# Patient Record
Sex: MALE | Race: WHITE | NOT HISPANIC OR LATINO | Employment: OTHER | URBAN - METROPOLITAN AREA
[De-identification: names, ages, dates, MRNs, and addresses within clinical notes are randomized per-mention and may not be internally consistent; named-entity substitution may affect disease eponyms.]

---

## 2020-08-10 ENCOUNTER — TELEPHONE (OUTPATIENT)
Dept: PULMONOLOGY | Facility: HOSPICE | Age: 65
End: 2020-08-10

## 2020-08-10 ENCOUNTER — OFFICE VISIT (OUTPATIENT)
Dept: PULMONOLOGY | Facility: HOSPICE | Age: 65
End: 2020-08-10
Payer: MEDICARE

## 2020-08-10 VITALS
HEIGHT: 70 IN | SYSTOLIC BLOOD PRESSURE: 128 MMHG | HEART RATE: 100 BPM | DIASTOLIC BLOOD PRESSURE: 80 MMHG | OXYGEN SATURATION: 91 % | TEMPERATURE: 98.4 F | RESPIRATION RATE: 16 BRPM | BODY MASS INDEX: 25.05 KG/M2 | WEIGHT: 175 LBS

## 2020-08-10 DIAGNOSIS — J84.9 ILD (INTERSTITIAL LUNG DISEASE) (HCC): ICD-10-CM

## 2020-08-10 DIAGNOSIS — R91.8 OTHER NONSPECIFIC ABNORMAL FINDING OF LUNG FIELD: ICD-10-CM

## 2020-08-10 DIAGNOSIS — M06.00 RHEUMATOID ARTHRITIS WITH NEGATIVE RHEUMATOID FACTOR, INVOLVING UNSPECIFIED SITE (HCC): ICD-10-CM

## 2020-08-10 DIAGNOSIS — G47.33 OSA (OBSTRUCTIVE SLEEP APNEA): ICD-10-CM

## 2020-08-10 DIAGNOSIS — M34.9 SCLERODERMA (HCC): ICD-10-CM

## 2020-08-10 PROCEDURE — 99204 OFFICE O/P NEW MOD 45 MIN: CPT | Performed by: INTERNAL MEDICINE

## 2020-08-10 RX ORDER — OMEPRAZOLE 20 MG/1
20 CAPSULE, DELAYED RELEASE ORAL DAILY
Status: ON HOLD | COMMUNITY
End: 2023-12-18

## 2020-08-10 RX ORDER — ALBUTEROL SULFATE 2.5 MG/3ML
2.5 SOLUTION RESPIRATORY (INHALATION) EVERY 4 HOURS PRN
COMMUNITY
End: 2021-10-13 | Stop reason: SDUPTHER

## 2020-08-10 RX ORDER — ALBUTEROL SULFATE 90 UG/1
2 AEROSOL, METERED RESPIRATORY (INHALATION) EVERY 6 HOURS PRN
COMMUNITY
End: 2024-01-08

## 2020-08-10 ASSESSMENT — ENCOUNTER SYMPTOMS
PALPITATIONS: 0
PND: 0
SORE THROAT: 0
TREMORS: 0
ABDOMINAL PAIN: 0
WHEEZING: 0
CONSTIPATION: 0
CHILLS: 0
PHOTOPHOBIA: 0
SPUTUM PRODUCTION: 0
DIZZINESS: 0
EYE DISCHARGE: 0
HEARTBURN: 0
NAUSEA: 0
WEIGHT LOSS: 0
ORTHOPNEA: 0
COUGH: 1
SPEECH CHANGE: 0
SINUS PAIN: 0
STRIDOR: 0
DIARRHEA: 0
VOMITING: 0
DOUBLE VISION: 0
WEAKNESS: 0
FALLS: 0
BACK PAIN: 0
EYE REDNESS: 0
HEADACHES: 0
CLAUDICATION: 0
FOCAL WEAKNESS: 0
EYE PAIN: 0
NECK PAIN: 0
DEPRESSION: 0
MYALGIAS: 0
SHORTNESS OF BREATH: 1
HEMOPTYSIS: 0
DIAPHORESIS: 0
BLURRED VISION: 0
FEVER: 0

## 2020-08-10 ASSESSMENT — PAIN SCALES - GENERAL: PAINLEVEL: NO PAIN

## 2020-08-10 NOTE — TELEPHONE ENCOUNTER
Ofev paperwork started for Patient's OFEV per request of Dr. Almaguer  Doctor at [facility] have already started the patient on medication. Patient is stating that we need to re-do it on our end.

## 2020-08-10 NOTE — TELEPHONE ENCOUNTER
Per covermymeds Esbriet Is the preferred Medication for .   I will still submit for OFEV - Per patient he has already been started

## 2020-08-10 NOTE — PROGRESS NOTES
Chief Complaint   Patient presents with   • New Patient     Pulmonary Fibosis       HPI: This patient is a 64 y.o. male presenting for evaluation of CTD-ILD.  The pt PMHx is significant for seronegative RA with associated ILD previously followed by [facility] team.  Patient's other past medical history includes hypertension, dyslipidemia and gastroesophageal reflux disease.  He is a former tobacco user with less than 20-pack-year history and quit in .  He worked as an  for both [employer] in [employer] with no known occupational exposures.  No family history of autoimmune disease.  His mother suffered with arthritis and some dementia thought both to be age-related and father was a tobacco user and  from lung cancer.  The patient has a diagnosis of seronegative rheumatoid arthritis for which she has been treated with leflunomide, sulfasalazine and Humira in the past.  Both Humira and leflunomide were stopped for infection, specifically pneumonia.  He does have underlying interstitial lung disease and a CT chest from our institution in 2016 shows fibrotic changes at the lung bases.  A report of CT chest done at [facility] in 2019 suggests progressive disease with bilateral, peripheral honeycombing.  I do not have any pulmonary function testing on the patient.  Never required supplemental oxygen.  He was started on Ofev as anti-fibrotic therapy for CTD associated ILD roughly 1 month ago.  So far he is tolerating this well and had recent hepatic function panel but I do not have the results from this.  He is presenting today mainly to establish care.  He is followed by Dr. Perkins in [place] for RA.  The patient is active and walks his dog daily.  He has a chronic cough which has been treated with proton pump inhibitor as well as gabapentin and apparently he was evaluated by ENT in the past and it was thought that he had gastroesophageal reflux disease with an element of LPR.  He has  been admitted twice in the past year for an presumed pneumonia and recently saw infectious disease in [place] and was started on Bactrim daily and azithromycin 3 times weekly but I do not have the records as to why this regimen was chosen and if he ever had any positive cultures.  The patient today denies chest pain, sputum production, shortness of breath although he does feel wearing a mask exacerbates his breathing.  No fevers, chills, night sweats, weight loss.    Past Medical History:   Diagnosis Date   • Hyperlipidemia    • Hypertension    • Shortness of breath    • Sleep apnea    • Wears glasses        Social History     Socioeconomic History   • Marital status:      Spouse name: Not on file   • Number of children: Not on file   • Years of education: Not on file   • Highest education level: Not on file   Occupational History   • Not on file   Social Needs   • Financial resource strain: Not on file   • Food insecurity     Worry: Not on file     Inability: Not on file   • Transportation needs     Medical: Not on file     Non-medical: Not on file   Tobacco Use   • Smoking status: Former Smoker   • Smokeless tobacco: Never Used   Substance and Sexual Activity   • Alcohol use: Yes   • Drug use: No   • Sexual activity: Not on file   Lifestyle   • Physical activity     Days per week: Not on file     Minutes per session: Not on file   • Stress: Not on file   Relationships   • Social connections     Talks on phone: Not on file     Gets together: Not on file     Attends Taoist service: Not on file     Active member of club or organization: Not on file     Attends meetings of clubs or organizations: Not on file     Relationship status: Not on file   • Intimate partner violence     Fear of current or ex partner: Not on file     Emotionally abused: Not on file     Physically abused: Not on file     Forced sexual activity: Not on file   Other Topics Concern   • Not on file   Social History Narrative   • Not on file        History reviewed. No pertinent family history.    Current Outpatient Medications on File Prior to Visit   Medication Sig Dispense Refill   • omeprazole (PRILOSEC) 20 MG delayed-release capsule Take 20 mg by mouth every day.     • albuterol (PROAIR HFA) 108 (90 Base) MCG/ACT Aero Soln inhalation aerosol Inhale 2 Puffs by mouth every 6 hours as needed for Shortness of Breath.     • albuterol (PROVENTIL) 2.5mg/3ml Nebu Soln solution for nebulization 2.5 mg by Nebulization route every four hours as needed for Shortness of Breath.     • senna-docusate (PERICOLACE OR SENOKOT S) 8.6-50 MG Tab Take 1 Tab by mouth every day. Prn for constipation 30 Tab 0   • methocarbamol (ROBAXIN) 750 MG Tab Take 1 Tab by mouth 3 times a day. For spams 90 Tab 0   • simvastatin (ZOCOR) 40 MG Tab Take 40 mg by mouth every day.     • leflunomide (ARAVA) 20 MG Tab Take 20 mg by mouth every day.     • lisinopril (PRINIVIL) 10 MG Tab Take 10 mg by mouth every day.     • gemfibrozil (LOPID) 600 MG Tab Take 600 mg by mouth 2 times a day.     • gabapentin (NEURONTIN) 100 MG Cap Take 100 mg by mouth 2 Times a Day.     • Probiotic Product (PROBIOTIC DAILY PO) Take 1 Cap by mouth every day.     • niacin 500 MG Tab Take 500 mg by mouth every evening.     • metronidazole (FLAGYL) 500 MG Tab Take 500 mg by mouth 3 times a day. Started 10 day course on 10/27.     • ciprofloxacin (CIPRO) 500 MG Tab Take 500 mg by mouth every 12 hours. Started 10 day course on 10/27.     • ranitidine (ZANTAC) 150 MG Tab Take 150 mg by mouth 2 times a day.       No current facility-administered medications on file prior to visit.        Allergies: Codeine and Tape    ROS:   Review of Systems   Constitutional: Negative for chills, diaphoresis, fever, malaise/fatigue and weight loss.   HENT: Negative for congestion, ear discharge, ear pain, hearing loss, nosebleeds, sinus pain, sore throat and tinnitus.    Eyes: Negative for blurred vision, double vision, photophobia,  "pain, discharge and redness.   Respiratory: Positive for cough and shortness of breath. Negative for hemoptysis, sputum production, wheezing and stridor.    Cardiovascular: Negative for chest pain, palpitations, orthopnea, claudication, leg swelling and PND.   Gastrointestinal: Negative for abdominal pain, constipation, diarrhea, heartburn, nausea and vomiting.   Genitourinary: Negative for dysuria and urgency.   Musculoskeletal: Negative for back pain, falls, joint pain, myalgias and neck pain.   Skin: Negative for itching and rash.   Neurological: Negative for dizziness, tremors, speech change, focal weakness, weakness and headaches.   Endo/Heme/Allergies: Negative for environmental allergies.   Psychiatric/Behavioral: Negative for depression.       /80   Pulse 100   Temp 36.9 °C (98.4 °F) (Temporal)   Resp 16   Ht 1.778 m (5' 10\")   Wt 79.4 kg (175 lb)   SpO2 91%     Physical Exam:  Physical Exam  Vitals signs reviewed.   Constitutional:       General: He is not in acute distress.     Appearance: Normal appearance. He is normal weight.   HENT:      Head: Normocephalic and atraumatic.      Right Ear: External ear normal.      Left Ear: External ear normal.      Nose: Nose normal. No congestion.      Mouth/Throat:      Mouth: Mucous membranes are moist.      Pharynx: Oropharynx is clear. No oropharyngeal exudate.   Eyes:      General: No scleral icterus.     Extraocular Movements: Extraocular movements intact.      Conjunctiva/sclera: Conjunctivae normal.      Pupils: Pupils are equal, round, and reactive to light.   Neck:      Musculoskeletal: Normal range of motion and neck supple.   Cardiovascular:      Rate and Rhythm: Normal rate and regular rhythm.      Heart sounds: Normal heart sounds. No murmur. No gallop.    Pulmonary:      Effort: Pulmonary effort is normal. No respiratory distress.      Breath sounds: No wheezing or rales.      Comments: Fine, early, dry inspiratory crackles b/l lung " bases  Abdominal:      General: There is no distension.      Palpations: Abdomen is soft.   Musculoskeletal: Normal range of motion.      Right lower leg: No edema.      Left lower leg: No edema.   Skin:     General: Skin is warm and dry.      Findings: No rash.   Neurological:      Mental Status: He is alert and oriented to person, place, and time.      Cranial Nerves: No cranial nerve deficit.   Psychiatric:         Mood and Affect: Mood normal.         Behavior: Behavior normal.         PFTs as reviewed by me personally: none    Imaging as reviewed by me personally: as per hPI    Assessment:  1. ILD (interstitial lung disease) (HCC)  PULMONARY FUNCTION TESTS -Test requested: Complete Pulmonary Function Test    Exercise Test for Bronchospasm / 6-Minute Walk    PULMONARY FUNCTION TESTS -Test requested: Complete Pulmonary Function Test    Exercise Test for Bronchospasm / 6-Minute Walk   2. Other nonspecific abnormal finding of lung field   PULMONARY FUNCTION TESTS -Test requested: Complete Pulmonary Function Test    Exercise Test for Bronchospasm / 6-Minute Walk    PULMONARY FUNCTION TESTS -Test requested: Complete Pulmonary Function Test    Exercise Test for Bronchospasm / 6-Minute Walk   3. Rheumatoid arthritis with negative rheumatoid factor, involving unspecified site (HCC)     4. SUPA (obstructive sleep apnea)         Plan:  1.  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  I agree that OFEV is an appropriate regimen at this point particularly if he is not having articular complaints and if he has not tolerated immunosuppression due to recurrent infection.  Will take over the prescription for anti-fibrotic therapy and plan to obtain baseline PFTs and 6-minute walk now given his just started therapy and in 3 months at follow-up.  I will try to obtain most recent testing done with [facility] and any past testing to understand his prior course and progression of  disease.  We can discuss enrollment in pulmonary rehab at follow-up.  2.  Duplicate diagnosis entered to obtain PFTs and 6-minute walk.  3.  Patient is followed by rheumatology and currently off all immunosuppression.  I have requested records from infectious disease to inquire about why he is on chronic antibiotic therapy.  We did discuss that gastroesophageal reflux disease is a mechanical issue that can be ongoing even though there is acid suppression.  I also discussed how he is at increased risk for reflux in the setting of rheumatoid arthritis and particularly in the setting of fibrotic lung disease.  I advised him to elevate his head of bed at night and for now we will continue PPI and gabapentin.  In the long run I would like to get him off the gabapentin if possible.  4.  Chronic and stable.  He is compliant with and benefiting from therapy.  No symptoms of excessive daytime sleepiness.  I reviewed the patient's compliance data from July 16 to August 14 which showed 83% compliance for greater than 7 hours per night on CPAP fixed pressure of 10 cm of water.  Treatment AHI was at goal measured less than 1.  We will get him established with sleep medicine and in the meantime continue current therapy.  Return in about 3 months (around 11/10/2020) for pfts and 6 mwt now and then at f/u in 3 months.

## 2020-08-11 ENCOUNTER — HOSPITAL ENCOUNTER (OUTPATIENT)
Dept: RADIOLOGY | Facility: MEDICAL CENTER | Age: 65
End: 2020-08-11
Payer: MEDICARE

## 2020-08-11 NOTE — TELEPHONE ENCOUNTER
Sent prescription to [facility] - [facility] states [facility] - found info to send to [facility].       Faxed; scanned into media

## 2020-08-12 NOTE — TELEPHONE ENCOUNTER
I am thinking that patient received a 30 free medication while they processed the claim.   I will call  and add scleroderma to the diagnosis and see what comes back.

## 2020-08-12 NOTE — TELEPHONE ENCOUNTER
Reprocessed PA via phone with [employer]! it has been Denied du to the lack of the following  1.The patient has failed therapy with esbriet due to progression of IPF  2. The patient has tried esbriet and experienced intolerable side effects  3. The patient is taking a drug which will interact with esbriet  4. 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   She will be gone nov 29th-Dcember 14th.  She will be gone for 16 days.  Pharmacy verified.

## 2020-08-12 NOTE — TELEPHONE ENCOUNTER
I spoke with Maximus and his wife and let them know what was going on.   They have agreed to appoint me to start the process for appeal. I will need to get them the form to sign so we can begin

## 2020-08-12 NOTE — TELEPHONE ENCOUNTER
[employer] has denied Ofev because Esbriet is the preferred IPF medication for insurance. We can try to appeal it or start process for Esbriet?    Scanned denial into media

## 2020-08-12 NOTE — TELEPHONE ENCOUNTER
He was already started on Ofev ?and for his diagnosis, Esbriet is not approved. He has scleroderma so Ofev is the drug indicated.

## 2020-08-14 ENCOUNTER — TELEPHONE (OUTPATIENT)
Dept: PULMONOLOGY | Facility: HOSPICE | Age: 65
End: 2020-08-14

## 2020-08-14 DIAGNOSIS — G47.33 OSA (OBSTRUCTIVE SLEEP APNEA): ICD-10-CM

## 2020-08-14 NOTE — TELEPHONE ENCOUNTER
Since patient no longer has BCBS and has medicare and , Slick is needing a document statting that he is using and benefiting from CPAP. He is no longer seeing sleep in [place]. He does not currently have a referral to our sleep center.     Is this something you are willing to do?  Recent compliance scanned into media.  If so a addendum to your last note would be needed with that added in there

## 2020-08-20 DIAGNOSIS — M34.9 SCLERODERMA (HCC): ICD-10-CM

## 2020-08-20 DIAGNOSIS — J84.9 ILD (INTERSTITIAL LUNG DISEASE) (HCC): ICD-10-CM

## 2020-08-20 RX ORDER — NINTEDANIB 150 MG/1
150 CAPSULE ORAL 2 TIMES DAILY
Qty: 60 CAP | Refills: 11 | Status: SHIPPED | OUTPATIENT
Start: 2020-08-20 | End: 2020-09-19

## 2020-08-20 NOTE — TELEPHONE ENCOUNTER
Received letter from [employer] - rodo has been overturned and approved for patient.   Called and advised Maximus and his wife. They will call [facility] for delivery.    Scanned into media

## 2020-09-04 ENCOUNTER — NON-PROVIDER VISIT (OUTPATIENT)
Dept: PULMONOLOGY | Facility: HOSPICE | Age: 65
End: 2020-09-04
Attending: INTERNAL MEDICINE
Payer: MEDICARE

## 2020-09-04 VITALS — WEIGHT: 182 LBS | HEIGHT: 70 IN | BODY MASS INDEX: 26.05 KG/M2

## 2020-09-04 DIAGNOSIS — J84.9 ILD (INTERSTITIAL LUNG DISEASE) (HCC): ICD-10-CM

## 2020-09-04 DIAGNOSIS — R91.8 OTHER NONSPECIFIC ABNORMAL FINDING OF LUNG FIELD: ICD-10-CM

## 2020-09-04 PROCEDURE — 94729 DIFFUSING CAPACITY: CPT | Performed by: INTERNAL MEDICINE

## 2020-09-04 PROCEDURE — 94618 PULMONARY STRESS TESTING: CPT | Performed by: INTERNAL MEDICINE

## 2020-09-04 PROCEDURE — 94060 EVALUATION OF WHEEZING: CPT | Performed by: INTERNAL MEDICINE

## 2020-09-04 PROCEDURE — 94726 PLETHYSMOGRAPHY LUNG VOLUMES: CPT | Performed by: INTERNAL MEDICINE

## 2020-09-04 ASSESSMENT — 6 MINUTE WALK TEST (6MWT)
PERCEIVED BREATHLESSNESS AT 2 MIN: 2
AMBULATES WITH O2: WITHOUT O2
TOTAL REST TIME: 0
HEART RATE AT 3 MIN: 124
HEART RATE AT 1 MIN: 126
HEART RATE AT 2 MIN: 124
PERCEIVED BREATHLESSNESS AT 2 MIN: 2
SAO2 AT 3 MIN: 92
SAO2 AT 2 MIN: 92
PERCEIVED BREATHLESSNESS AT 1 MIN: 2
SAO2 AT 5 MIN: 89
PERCEIVED BREATHLESSNESS AT 4 MIN: 2
PERCEIVED BREATHLESSNESS AT 3 MIN: 2
BLOOD PRESSURE: LEFT ARM
PERCEIVED BREATHLESSNESS AT 5 MIN: 2
SAO2 AT 2 MIN: 92
PERCEIVED FATIGUE AT 2 MIN: 0
BLOOD PRESSURE AT 1 MIN: 132/86
SAO2 AT 1 MIN: 90
HEART RATE: 111
O2 SAT PERCENT ROOM AIR: 91
PERCENT OF NORMAL WALKED: 53
PERCEIVED FATIGUE AT 1 MIN: 0
PERCEIVED FATIGUE AT 6 MIN: 0
SAO2 AT 6 MIN: 88
SAO2 AT 4 MIN: 90
HEART RATE AT 5 MIN: 126
SAO2 AT 1 MIN: 89
PERCEIVED FATIGUE AT 2 MIN: 0
PERCEIVED FATIGUE AT 5 MIN: 0
PERCEIVED BREATHLESSNESS AT 1 MIN: 2
HEART RATE AT 2 MIN: 122
HEART RATE AT 6 MIN: 126
HEART RATE AT 1 MIN: 110
PERCEIVED BREATHLESSNESS AT 6 MIN: 2
SITTING BLOOD PRESSURE: 128/80
HEART RATE AT 4 MIN: 124
COMMENTS: TEST ON ROOM AIR.
PERCEIVED FATIGUE AT 1 MIN: 0
PERCEIVED FATIGUE AT 3 MIN: 0
PERCEIVED FATIGUE AT 4 MIN: 0
NUMBER OF RESTS: 0

## 2020-09-04 ASSESSMENT — PULMONARY FUNCTION TESTS
FEV1/FVC: 84
FEV1_PERCENT_PREDICTED: 62
FVC_PERCENT_PREDICTED: 55
FEV1_PERCENT_PREDICTED: 64
FEV1_PERCENT_CHANGE: 3
FEV1/FVC_PERCENT_PREDICTED: 116
FEV1/FVC: 87
FEV1_LLN: 2.87
FEV1/FVC_PREDICTED: 75
FVC_PREDICTED: 4.61
FEV1/FVC_PERCENT_PREDICTED: 113
FEV1/FVC: 87.11
FVC_LLN: 3.85
FEV1/FVC_PERCENT_PREDICTED: 75
FEV1_PERCENT_CHANGE: 0
FEV1/FVC_PERCENT_PREDICTED: 116
FEV1/FVC_PERCENT_CHANGE: 3
FVC: 2.56
FEV1_PREDICTED: 3.44
FVC: 2.56
FEV1: 2.15
FEV1: 2.23
FEV1/FVC_PERCENT_PREDICTED: 112
FEV1/FVC_PERCENT_LLN: 62
FVC_PERCENT_PREDICTED: 55
FEV1/FVC: 84

## 2020-09-04 NOTE — PROCEDURES
Tech: Marta Torres, RRT  Tech notes: Good patient effort & cooperation.  Patient experienced light headedness for several seconds after FVC maneuvers.  The results of this test meet the ATS/ERS standards for acceptability & reproducibility.  Test was performed on the Code Blue Body Plethysmograph-Elite DX system.  Predicted values were GLI-2012 for spirometry, GLI- 2017 for DLCO, ITS for Lung Volumes.  The DLCO was uncorrected for Hgb.  A bronchodilator of Ventolin HFA -2puffs via spacer administered.  DLCO performed during dilation period.    Interpretation:  1.  Baseline spirometry shows mild restriction with FVC of 2.56 L or 55% predicted and FEV1/FVC ratio of 84.  FEV1 is also mildly reduced at 2.15 L or 62% predicted.  2.  There is no significant bronchodilator response.  3.  Lung volumes are restricted with total lung capacity of 3.65 L or 52% predicted.  4.  Diffusion capacity is at the lower limits of normal at 80% predicted.  Pulmonary function testing shows mainly restrictive defect with low normal DLCO consistent with a diagnosis of interstitial lung disease.

## 2020-09-04 NOTE — PROCEDURES
Test on Room Air  Patient completed a 6-minute walk test on September 4, 2020.  He achieved 53% of the predicted distance based on age weight and height.  Saturations were 91% on room air, dropped to 89% and then 88% at the 6-minute stuart.  This represents mild desaturation, borderline reduction at the level of activity achieved with significant reduction of predicted distance and mild tachycardia noted throughout the study

## 2020-10-20 ENCOUNTER — PATIENT MESSAGE (OUTPATIENT)
Dept: PULMONOLOGY | Facility: HOSPICE | Age: 65
End: 2020-10-20

## 2020-10-20 DIAGNOSIS — J84.9 ILD (INTERSTITIAL LUNG DISEASE) (HCC): ICD-10-CM

## 2020-10-21 ENCOUNTER — TELEPHONE (OUTPATIENT)
Dept: PULMONOLOGY | Facility: HOSPICE | Age: 65
End: 2020-10-21

## 2020-10-21 RX ORDER — NINTEDANIB 100 MG/1
100 CAPSULE ORAL 2 TIMES DAILY
Qty: 60 CAP | Refills: 11 | Status: SHIPPED | OUTPATIENT
Start: 2020-10-21 | End: 2022-02-09

## 2020-10-21 NOTE — PROGRESS NOTES
Patient calling with concerns regarding adverse reaction to Ofev and would like to decrease dose to 100 mg twice daily.  We will make this change and reassess symptoms at follow-up with me in November.  In the meantime he would also like a referral to pulmonary rehab which I think is necessary and appropriate and I have placed this.

## 2020-10-21 NOTE — TELEPHONE ENCOUNTER
----- Message from Silvana Ramos sent at 10/21/2020  1:18 PM PDT -----  Regarding: FW: pulmonary rehab  Contact: [phone]    ----- Message -----  From: Maximus Bender  Sent: 10/15/2020   2:12 PM PDT  To: Amb All Mas  Subject: pulmonary rehab                                  Topic: Referral Status    I talked with a representative of [facility], and she suggested that I get rehab for my lungs. She mentioned the pulmonary rehab at [facility], we called them and your office needs to send them a referral. Please let me know if that possible. Thank you I advance.

## 2020-11-12 ENCOUNTER — OFFICE VISIT (OUTPATIENT)
Dept: SLEEP MEDICINE | Facility: MEDICAL CENTER | Age: 65
End: 2020-11-12
Payer: MEDICARE

## 2020-11-12 VITALS
RESPIRATION RATE: 18 BRPM | BODY MASS INDEX: 26.05 KG/M2 | OXYGEN SATURATION: 93 % | HEART RATE: 77 BPM | WEIGHT: 182 LBS | DIASTOLIC BLOOD PRESSURE: 78 MMHG | HEIGHT: 70 IN | TEMPERATURE: 98.8 F | SYSTOLIC BLOOD PRESSURE: 134 MMHG

## 2020-11-12 DIAGNOSIS — R05.9 COUGH: ICD-10-CM

## 2020-11-12 DIAGNOSIS — M06.9 RHEUMATOID ARTHRITIS, INVOLVING UNSPECIFIED SITE, UNSPECIFIED WHETHER RHEUMATOID FACTOR PRESENT (HCC): ICD-10-CM

## 2020-11-12 DIAGNOSIS — R06.02 SOB (SHORTNESS OF BREATH): ICD-10-CM

## 2020-11-12 DIAGNOSIS — J84.9 ILD (INTERSTITIAL LUNG DISEASE) (HCC): ICD-10-CM

## 2020-11-12 PROCEDURE — 99214 OFFICE O/P EST MOD 30 MIN: CPT | Performed by: INTERNAL MEDICINE

## 2020-11-12 RX ORDER — BENZONATATE 100 MG/1
100 CAPSULE ORAL 3 TIMES DAILY PRN
Qty: 60 CAP | Refills: 3 | Status: SHIPPED | OUTPATIENT
Start: 2020-11-12 | End: 2021-04-27

## 2020-11-12 RX ORDER — AMLODIPINE BESYLATE 5 MG/1
10 TABLET ORAL DAILY
COMMUNITY
Start: 2020-09-12 | End: 2024-01-08

## 2020-11-12 RX ORDER — ALBUTEROL SULFATE 2.5 MG/3ML
2.5 SOLUTION RESPIRATORY (INHALATION) 2 TIMES DAILY PRN
Qty: 60 BULLET | Refills: 3 | Status: SHIPPED | OUTPATIENT
Start: 2020-11-12 | End: 2024-02-08

## 2020-11-12 RX ORDER — AZITHROMYCIN 500 MG/1
TABLET, FILM COATED ORAL
COMMUNITY
Start: 2020-10-22 | End: 2024-01-08

## 2020-11-12 RX ORDER — SULFAMETHOXAZOLE AND TRIMETHOPRIM 800; 160 MG/1; MG/1
0.5 TABLET ORAL ONCE
COMMUNITY
Start: 2020-10-31

## 2020-11-12 RX ORDER — EZETIMIBE 10 MG/1
TABLET ORAL
COMMUNITY
Start: 2020-08-25

## 2020-11-12 ASSESSMENT — ENCOUNTER SYMPTOMS
STRIDOR: 0
BACK PAIN: 0
HEADACHES: 0
SHORTNESS OF BREATH: 1
ABDOMINAL PAIN: 0
EYE DISCHARGE: 0
WHEEZING: 0
CONSTIPATION: 0
SORE THROAT: 0
FALLS: 0
HEARTBURN: 0
CHILLS: 0
PND: 0
NECK PAIN: 0
VOMITING: 0
DOUBLE VISION: 0
ORTHOPNEA: 0
DEPRESSION: 0
FOCAL WEAKNESS: 0
CLAUDICATION: 0
DIZZINESS: 0
BLURRED VISION: 0
DIARRHEA: 0
COUGH: 1
SPUTUM PRODUCTION: 0
PALPITATIONS: 0
DIAPHORESIS: 0
FEVER: 0
SINUS PAIN: 0
SPEECH CHANGE: 0
TREMORS: 0
WEAKNESS: 0
PHOTOPHOBIA: 0
EYE PAIN: 0
MYALGIAS: 0
NAUSEA: 0
WEIGHT LOSS: 0
EYE REDNESS: 0
HEMOPTYSIS: 0

## 2020-11-12 NOTE — PROGRESS NOTES
Chief Complaint   Patient presents with   • Follow-Up     ILD last seen 8/10/2020    • Results     CPFT, 6MW 9/4/2020          HPI: This patient is a 65 y.o. male whom is followed in our clinic for RA-associated ILD last seen by me on 8/10/20. The pt PMHx is significant for seronegative RA with associated ILD previously followed by [facility] pulmonary team.  Patient's other past medical history includes hypertension, dyslipidemia and gastroesophageal reflux disease.  He is a former tobacco user with less than 20-pack-year history and quit in 1995.  He worked as an  for both the [employer] in the [employer] with no known occupational exposures.  No family history of autoimmune disease. The patient has a diagnosis of seronegative rheumatoid arthritis for which she has been treated with leflunomide, sulfasalazine and Humira in the past.  Both Humira and leflunomide were stopped for infection, specifically pneumonia.  He does have underlying interstitial lung disease and a CT chest from our institution in 2016 shows fibrotic changes at the lung bases.  A report of CT chest done at [facility] in July 2019 suggests progressive disease with bilateral, peripheral honeycombing.  He was started on Ofev his antifibrotic therapy for CTD associated ILD in July of this year.  He presented to establish care with me in August.  I did not have any pulmonary function testing at the time.  He had never required supplemental oxygen.  We obtained pulmonary function testing on September 4 which showed FVC of 2.56 L or 55% predicted, total lung capacity of 3.65 L or 52% predicted and DLCO of 80% predicted.  He did desaturate during 6-minute walk test to in a deer of 88%.  We have not started supplemental oxygen but he has been referred to pulmonary rehab and he plans to start therapy in March.  He presents today for routine follow-up.  He had [facility] but I do not have these results yet.  Overall he  feels quite well.  He has stable dyspnea on exertion particularly when lifting objects or doing any sort of resistance training.  He can walk on a level surface with his dog with no symptoms.  He has a chronic dry cough that per wife is more annoying than troublesome.  He has been treated aggressively for gastroesophageal reflux disease but more recently began reintroducing foods he was avoiding given suspicion that his dry cough is more likely his pulmonary disease.  He is up-to-date on vaccines.  He was recently started on hydroxychloroquine with Dr. Perkins.  We did have to reduce his Ofev dose due to diarrhea however it is too early to know if this has affected him as he only got his lower dose today.    Past Medical History:   Diagnosis Date   • Hyperlipidemia    • Hypertension    • Shortness of breath    • Sleep apnea    • Wears glasses        Social History     Socioeconomic History   • Marital status:      Spouse name: Not on file   • Number of children: Not on file   • Years of education: Not on file   • Highest education level: Not on file   Occupational History   • Not on file   Social Needs   • Financial resource strain: Not on file   • Food insecurity     Worry: Not on file     Inability: Not on file   • Transportation needs     Medical: Not on file     Non-medical: Not on file   Tobacco Use   • Smoking status: Former Smoker     Packs/day: 0.50     Types: Cigarettes     Start date:      Quit date:      Years since quittin.8   • Smokeless tobacco: Never Used   Substance and Sexual Activity   • Alcohol use: Yes   • Drug use: No   • Sexual activity: Not on file   Lifestyle   • Physical activity     Days per week: Not on file     Minutes per session: Not on file   • Stress: Not on file   Relationships   • Social connections     Talks on phone: Not on file     Gets together: Not on file     Attends Bahai service: Not on file     Active member of club or organization: Not on file      Attends meetings of clubs or organizations: Not on file     Relationship status: Not on file   • Intimate partner violence     Fear of current or ex partner: Not on file     Emotionally abused: Not on file     Physically abused: Not on file     Forced sexual activity: Not on file   Other Topics Concern   • Not on file   Social History Narrative   • Not on file       History reviewed. No pertinent family history.    Current Outpatient Medications on File Prior to Visit   Medication Sig Dispense Refill   • azithromycin (ZITHROMAX) 500 MG tablet      • amLODIPine (NORVASC) 5 MG Tab Take  by mouth every day. Take 1 tablet by mouth every day     • ezetimibe (ZETIA) 10 MG Tab TK 1 T PO ONE TIME D     • sulfamethoxazole-trimethoprim (BACTRIM DS) 800-160 MG tablet Take  by mouth. Take 1 tablet by mouth two times a day     • Nintedanib Esylate (OFEV) 100 MG Cap Take 100 mg by mouth 2 Times a Day. 60 Cap 11   • omeprazole (PRILOSEC) 20 MG delayed-release capsule Take 20 mg by mouth every day.     • albuterol (PROAIR HFA) 108 (90 Base) MCG/ACT Aero Soln inhalation aerosol Inhale 2 Puffs by mouth every 6 hours as needed for Shortness of Breath.     • albuterol (PROVENTIL) 2.5mg/3ml Nebu Soln solution for nebulization 2.5 mg by Nebulization route every four hours as needed for Shortness of Breath.     • simvastatin (ZOCOR) 40 MG Tab Take 40 mg by mouth every day.     • Probiotic Product (PROBIOTIC DAILY PO) Take 1 Cap by mouth every day.     • senna-docusate (PERICOLACE OR SENOKOT S) 8.6-50 MG Tab Take 1 Tab by mouth every day. Prn for constipation 30 Tab 0   • methocarbamol (ROBAXIN) 750 MG Tab Take 1 Tab by mouth 3 times a day. For spams 90 Tab 0   • metronidazole (FLAGYL) 500 MG Tab Take 500 mg by mouth 3 times a day. Started 10 day course on 10/27.     • ciprofloxacin (CIPRO) 500 MG Tab Take 500 mg by mouth every 12 hours. Started 10 day course on 10/27.     • leflunomide (ARAVA) 20 MG Tab Take 20 mg by mouth every day.    "  • lisinopril (PRINIVIL) 10 MG Tab Take 10 mg by mouth every day.     • gemfibrozil (LOPID) 600 MG Tab Take 600 mg by mouth 2 times a day.     • gabapentin (NEURONTIN) 100 MG Cap Take 100 mg by mouth 2 Times a Day.     • ranitidine (ZANTAC) 150 MG Tab Take 150 mg by mouth 2 times a day.     • niacin 500 MG Tab Take 500 mg by mouth every evening.       No current facility-administered medications on file prior to visit.        Codeine and Tape      ROS:   Review of Systems   Constitutional: Negative for chills, diaphoresis, fever, malaise/fatigue and weight loss.   HENT: Negative for congestion, ear discharge, ear pain, hearing loss, nosebleeds, sinus pain, sore throat and tinnitus.    Eyes: Negative for blurred vision, double vision, photophobia, pain, discharge and redness.   Respiratory: Positive for cough and shortness of breath. Negative for hemoptysis, sputum production, wheezing and stridor.    Cardiovascular: Negative for chest pain, palpitations, orthopnea, claudication, leg swelling and PND.   Gastrointestinal: Negative for abdominal pain, constipation, diarrhea, heartburn, nausea and vomiting.   Genitourinary: Negative for dysuria and urgency.   Musculoskeletal: Negative for back pain, falls, joint pain, myalgias and neck pain.   Skin: Negative for itching and rash.   Neurological: Negative for dizziness, tremors, speech change, focal weakness, weakness and headaches.   Endo/Heme/Allergies: Negative for environmental allergies.   Psychiatric/Behavioral: Negative for depression.       /78 (BP Location: Right arm, Patient Position: Sitting, BP Cuff Size: Adult)   Pulse 77   Temp 37.1 °C (98.8 °F) (Temporal)   Resp 18   Ht 1.778 m (5' 10\")   Wt 82.6 kg (182 lb)   SpO2 93%   Physical Exam  Vitals signs reviewed.   Constitutional:       General: He is not in acute distress.     Appearance: Normal appearance. He is normal weight.   HENT:      Head: Normocephalic and atraumatic.      Right Ear: " External ear normal.      Left Ear: External ear normal.      Nose: Nose normal. No congestion.      Mouth/Throat:      Mouth: Mucous membranes are moist.      Pharynx: Oropharynx is clear. No oropharyngeal exudate.   Eyes:      General: No scleral icterus.     Extraocular Movements: Extraocular movements intact.      Conjunctiva/sclera: Conjunctivae normal.      Pupils: Pupils are equal, round, and reactive to light.   Neck:      Musculoskeletal: Normal range of motion and neck supple.   Cardiovascular:      Rate and Rhythm: Normal rate and regular rhythm.      Heart sounds: Normal heart sounds. No murmur. No gallop.    Pulmonary:      Effort: No respiratory distress.      Breath sounds: No wheezing.      Comments: Bilateral, dry, early inspiratory crackles at lung bases  Abdominal:      General: There is no distension.      Palpations: Abdomen is soft.   Musculoskeletal: Normal range of motion.      Right lower leg: No edema.      Left lower leg: No edema.   Skin:     General: Skin is warm and dry.      Findings: No rash.   Neurological:      Mental Status: He is alert and oriented to person, place, and time.      Cranial Nerves: No cranial nerve deficit.   Psychiatric:         Mood and Affect: Mood normal.         Behavior: Behavior normal.         PFTs as reviewed by me personally: as per hPI    Imaging as reviewed by me personally:  As per HPI    Assessment:  1. Rheumatoid arthritis, involving unspecified site, unspecified whether rheumatoid factor present (HCC)  EC-ECHOCARDIOGRAM COMPLETE W/O CONT   2. ILD (interstitial lung disease) (HCC)  EC-ECHOCARDIOGRAM COMPLETE W/O CONT    HEPATIC FUNCTION PANEL   3. SOB (shortness of breath)  EC-ECHOCARDIOGRAM COMPLETE W/O CONT   4. Cough  benzonatate (TESSALON PERLES) 100 MG Cap    albuterol (PROVENTIL) 2.5mg/3ml Nebu Soln solution for nebulization       Plan:  1.  Chronic and followed by Dr. Perkins.  Currently symptoms are well controlled.  We did discuss the risk  for pulmonary hypertension which is less than rheumatoid arthritis but certainly is a risk associated with connective tissue disease and specifically patients with ILD.  I am ordering baseline echocardiogram.  We will continue to manage pulmonary aspects of his RA and he will follow-up with Dr. Perkins.  2.  Connective tissue disease associated ILD with mainly fibrosis.  He did have to reduce Ofev dose due to diarrhea.  We have yet to see if this is helpful.  No evidence of volume depletion due to diarrhea but this is a troubling symptom for the patient.  I will obtain hepatic function panel from [facility] and order standing for every 3 months.  We will order echocardiogram given desaturation as I did not push to start supplemental oxygen with activity today but if there is any evidence of elevated right-sided pressures on his echo, then I would be more pressed to start supplemental oxygen with activity.  Certainly we will have a better idea of what his oxygen is doing with activity once he is enrolled in pulmonary rehab.  We will continue Ofev.  Start rehab in March.  But I will see him before that in February.  3.  Chronic and stable.  Likely secondary to ILD.  See discussion above regarding supplemental oxygen.  He will enroll in pulmonary rehab this March.  We will obtain baseline echo.  Continue Ofev.  4.  This is chronic and likely secondary to ILD.  We did discuss that bronchodilators do not necessarily have a role in ILD however if they give him relief, there is little to no harm that can be had by use of these as needed.  I did give him some Tessalon Perles for symptomatic relief if helpful.  Return in about 3 months (around 2/12/2021).

## 2020-11-23 ENCOUNTER — TELEPHONE (OUTPATIENT)
Dept: SLEEP MEDICINE | Facility: MEDICAL CENTER | Age: 65
End: 2020-11-23

## 2020-11-23 NOTE — TELEPHONE ENCOUNTER
Caller: Maximus    Phone Number:[phone]    Message: Pt called and lm. He wanted to have his EKG done in [place].      11/23/20: Called and spoke with pt.  Notified pt his order has been sent to [facility].  He said they haven't called him yet.  Recommend pt to call them to schedule. If they say they don't have the order, we can re-fax it to them. Pt understood.

## 2021-02-17 ENCOUNTER — APPOINTMENT (OUTPATIENT)
Dept: SLEEP MEDICINE | Facility: MEDICAL CENTER | Age: 66
End: 2021-02-17
Payer: MEDICARE

## 2021-02-22 ENCOUNTER — OFFICE VISIT (OUTPATIENT)
Dept: SLEEP MEDICINE | Facility: MEDICAL CENTER | Age: 66
End: 2021-02-22
Payer: MEDICARE

## 2021-02-22 VITALS
HEART RATE: 86 BPM | WEIGHT: 175 LBS | OXYGEN SATURATION: 93 % | DIASTOLIC BLOOD PRESSURE: 76 MMHG | SYSTOLIC BLOOD PRESSURE: 144 MMHG | RESPIRATION RATE: 18 BRPM | BODY MASS INDEX: 25.05 KG/M2 | TEMPERATURE: 98.4 F | HEIGHT: 70 IN

## 2021-02-22 DIAGNOSIS — M06.9 RHEUMATOID ARTHRITIS, INVOLVING UNSPECIFIED SITE, UNSPECIFIED WHETHER RHEUMATOID FACTOR PRESENT (HCC): ICD-10-CM

## 2021-02-22 DIAGNOSIS — J84.9 ILD (INTERSTITIAL LUNG DISEASE) (HCC): ICD-10-CM

## 2021-02-22 DIAGNOSIS — R91.8 OTHER NONSPECIFIC ABNORMAL FINDING OF LUNG FIELD: ICD-10-CM

## 2021-02-22 PROCEDURE — 99214 OFFICE O/P EST MOD 30 MIN: CPT | Performed by: INTERNAL MEDICINE

## 2021-02-22 ASSESSMENT — ENCOUNTER SYMPTOMS
DOUBLE VISION: 0
EYE REDNESS: 0
COUGH: 0
PHOTOPHOBIA: 0
SINUS PAIN: 0
DIAPHORESIS: 0
FOCAL WEAKNESS: 0
WHEEZING: 0
VOMITING: 0
TREMORS: 0
HEADACHES: 0
DEPRESSION: 0
SORE THROAT: 0
CONSTIPATION: 0
PND: 0
ORTHOPNEA: 0
NECK PAIN: 0
SPUTUM PRODUCTION: 0
CLAUDICATION: 0
PALPITATIONS: 0
DIARRHEA: 1
EYE PAIN: 0
HEMOPTYSIS: 0
EYE DISCHARGE: 0
DIZZINESS: 0
WEIGHT LOSS: 0
HEARTBURN: 0
CHILLS: 0
MYALGIAS: 0
FEVER: 0
SPEECH CHANGE: 0
ABDOMINAL PAIN: 0
BLURRED VISION: 0
WEAKNESS: 0
FALLS: 0
BACK PAIN: 0
SHORTNESS OF BREATH: 0
NAUSEA: 0
STRIDOR: 0

## 2021-02-22 NOTE — PROGRESS NOTES
Chief Complaint   Patient presents with   • Follow-Up     ILD last seen 11/12/2020          HPI: This patient is a 65 y.o. male whom is followed in our clinic for CTD-ILD last seen by me on 11/12/2020.  Past medical history includes hypertension, dyslipidemia, gastroesophageal reflux disease and seronegative rheumatoid arthritis followed by Dr. Perkins.  He is a former tobacco user with less than 20-pack-year history and quit 1995.  He did work as  for both the [employer] in the [employer].  No family history of autoimmune disease.  Patient is currently only on sulfasalazine but has been on Humira and leflunomide in the past.  Currently he is followed by ID and on suppressive antibiotics due to recurrent pneumonia.  He does have underlying interstitial lung disease and a CT chest from our institution in 2016 shows fibrotic changes at the lung bases.  A report of CT chest done at [facility] in July 2019 suggests progressive disease with bilateral, peripheral honeycombing.  He was started on Ofev antifibrotic therapy for CTD associated ILD in July of last year and prior to establishing care with me.  pulmonary function testing on September 4 which showed FVC of 2.56 L or 55% predicted, total lung capacity of 3.65 L or 52% predicted and DLCO of 80% predicted.  He did desaturate during 6-minute walk test to in a deer of 88%.  We have not started supplemental oxygen but he has been referred to pulmonary rehab and he plans to start therapy in March.  Patient presents today for routine follow-up.  We did have to reduce his Ofev dose from 150 mg BID to 100 mg BID due to diarrhea.  He is tolerating lower dose better.  No acute concerns today.    Past Medical History:   Diagnosis Date   • Hyperlipidemia    • Hypertension    • Shortness of breath    • Sleep apnea    • Wears glasses        Social History     Socioeconomic History   • Marital status:      Spouse name: Not on file   • Number of children: Not on file    • Years of education: Not on file   • Highest education level: Not on file   Occupational History   • Not on file   Tobacco Use   • Smoking status: Former Smoker     Packs/day: 0.50     Types: Cigarettes     Start date:      Quit date:      Years since quittin.1   • Smokeless tobacco: Never Used   Substance and Sexual Activity   • Alcohol use: Yes   • Drug use: No   • Sexual activity: Not on file   Other Topics Concern   • Not on file   Social History Narrative   • Not on file     Social Determinants of Health     Financial Resource Strain:    • Difficulty of Paying Living Expenses:    Food Insecurity:    • Worried About Running Out of Food in the Last Year:    • Ran Out of Food in the Last Year:    Transportation Needs:    • Lack of Transportation (Medical):    • Lack of Transportation (Non-Medical):    Physical Activity:    • Days of Exercise per Week:    • Minutes of Exercise per Session:    Stress:    • Feeling of Stress :    Social Connections:    • Frequency of Communication with Friends and Family:    • Frequency of Social Gatherings with Friends and Family:    • Attends Scientology Services:    • Active Member of Clubs or Organizations:    • Attends Club or Organization Meetings:    • Marital Status:    Intimate Partner Violence:    • Fear of Current or Ex-Partner:    • Emotionally Abused:    • Physically Abused:    • Sexually Abused:        No family history on file.    Current Outpatient Medications on File Prior to Visit   Medication Sig Dispense Refill   • azithromycin (ZITHROMAX) 500 MG tablet      • amLODIPine (NORVASC) 5 MG Tab Take  by mouth every day. Take 1 tablet by mouth every day     • ezetimibe (ZETIA) 10 MG Tab TK 1 T PO ONE TIME D     • sulfamethoxazole-trimethoprim (BACTRIM DS) 800-160 MG tablet Take  by mouth. Take 1 tablet by mouth two times a day     • benzonatate (TESSALON PERLES) 100 MG Cap Take 1 Cap by mouth 3 times a day as needed for Cough. 60 Cap 3   • albuterol  (PROVENTIL) 2.5mg/3ml Nebu Soln solution for nebulization Take 3 mL by nebulization 2 times a day as needed for Shortness of Breath. 60 Bullet 3   • Nintedanib Esylate (OFEV) 100 MG Cap Take 100 mg by mouth 2 Times a Day. 60 Cap 11   • omeprazole (PRILOSEC) 20 MG delayed-release capsule Take 20 mg by mouth every day.     • albuterol (PROAIR HFA) 108 (90 Base) MCG/ACT Aero Soln inhalation aerosol Inhale 2 Puffs by mouth every 6 hours as needed for Shortness of Breath.     • albuterol (PROVENTIL) 2.5mg/3ml Nebu Soln solution for nebulization 2.5 mg by Nebulization route every four hours as needed for Shortness of Breath.     • senna-docusate (PERICOLACE OR SENOKOT S) 8.6-50 MG Tab Take 1 Tab by mouth every day. Prn for constipation 30 Tab 0   • methocarbamol (ROBAXIN) 750 MG Tab Take 1 Tab by mouth 3 times a day. For spams 90 Tab 0   • metronidazole (FLAGYL) 500 MG Tab Take 500 mg by mouth 3 times a day. Started 10 day course on 10/27.     • ciprofloxacin (CIPRO) 500 MG Tab Take 500 mg by mouth every 12 hours. Started 10 day course on 10/27.     • simvastatin (ZOCOR) 40 MG Tab Take 40 mg by mouth every day.     • leflunomide (ARAVA) 20 MG Tab Take 20 mg by mouth every day.     • lisinopril (PRINIVIL) 10 MG Tab Take 10 mg by mouth every day.     • gemfibrozil (LOPID) 600 MG Tab Take 600 mg by mouth 2 times a day.     • gabapentin (NEURONTIN) 100 MG Cap Take 100 mg by mouth 2 Times a Day.     • ranitidine (ZANTAC) 150 MG Tab Take 150 mg by mouth 2 times a day.     • Probiotic Product (PROBIOTIC DAILY PO) Take 1 Cap by mouth every day.     • niacin 500 MG Tab Take 500 mg by mouth every evening.       No current facility-administered medications on file prior to visit.       Codeine and Tape      ROS:   Review of Systems   Constitutional: Negative for chills, diaphoresis, fever, malaise/fatigue and weight loss.   HENT: Negative for congestion, ear discharge, ear pain, hearing loss, nosebleeds, sinus pain, sore throat and  "tinnitus.    Eyes: Negative for blurred vision, double vision, photophobia, pain, discharge and redness.   Respiratory: Negative for cough, hemoptysis, sputum production, shortness of breath, wheezing and stridor.    Cardiovascular: Negative for chest pain, palpitations, orthopnea, claudication, leg swelling and PND.   Gastrointestinal: Positive for diarrhea. Negative for abdominal pain, constipation, heartburn, nausea and vomiting.   Genitourinary: Negative for dysuria and urgency.   Musculoskeletal: Negative for back pain, falls, joint pain, myalgias and neck pain.   Skin: Negative for itching and rash.   Neurological: Negative for dizziness, tremors, speech change, focal weakness, weakness and headaches.   Endo/Heme/Allergies: Negative for environmental allergies.   Psychiatric/Behavioral: Negative for depression.       /76 (BP Location: Right arm, Patient Position: Sitting, BP Cuff Size: Adult)   Pulse 86   Temp 36.9 °C (98.4 °F) (Temporal)   Resp 18   Ht 1.778 m (5' 10\")   Wt 79.4 kg (175 lb)   SpO2 93%   Physical Exam  Vitals reviewed.   Constitutional:       General: He is not in acute distress.     Appearance: Normal appearance. He is normal weight.   HENT:      Head: Normocephalic and atraumatic.      Right Ear: External ear normal.      Left Ear: External ear normal.      Nose: Nose normal. No congestion.      Mouth/Throat:      Mouth: Mucous membranes are moist.      Pharynx: Oropharynx is clear. No oropharyngeal exudate.   Eyes:      General: No scleral icterus.     Extraocular Movements: Extraocular movements intact.      Conjunctiva/sclera: Conjunctivae normal.      Pupils: Pupils are equal, round, and reactive to light.   Cardiovascular:      Rate and Rhythm: Normal rate and regular rhythm.      Heart sounds: Normal heart sounds. No murmur. No gallop.    Pulmonary:      Effort: Pulmonary effort is normal. No respiratory distress.      Comments: Early, dry fine inspiratory crackles " bilaterally 25% posterior lung fields  Abdominal:      General: There is no distension.      Palpations: Abdomen is soft.   Musculoskeletal:         General: Normal range of motion.      Cervical back: Normal range of motion and neck supple.      Right lower leg: No edema.      Left lower leg: No edema.   Skin:     General: Skin is warm and dry.      Findings: No rash.   Neurological:      Mental Status: He is alert and oriented to person, place, and time.      Cranial Nerves: No cranial nerve deficit.   Psychiatric:         Mood and Affect: Mood normal.         Behavior: Behavior normal.         PFTs as reviewed by me personally: As per HPI    Imaging as reviewed by me personally: As per HPI    Assessment:  1. ILD (interstitial lung disease) (HCC)  PULMONARY FUNCTION TESTS -Test requested: Complete Pulmonary Function Test    Exercise Test for Bronchospasm / 6-Minute Walk   2. Rheumatoid arthritis, involving unspecified site, unspecified whether rheumatoid factor present (HCC)     3. Other nonspecific abnormal finding of lung field   PULMONARY FUNCTION TESTS -Test requested: Complete Pulmonary Function Test    Exercise Test for Bronchospasm / 6-Minute Walk       Plan:  1.  Chronic and unknown whether or not progressive as I only have 1 set of pulmonary function test.  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  I would like to bring the patient back in the next 3 months with repeat pulmonary function testing and 6-minute walk at that time.  He will have been enrolled in pulmonary rehab by then.  For now we will continue Ofev at 100 mg twice daily.  Hepatic function panel within normal limits.  He has standing order for every 3 months.  2.  Followed by rheumatology.  Currently well controlled on sulfasalazine.  We will continue to manage pulmonary complications of his CTD.  3.  Duplicate diagnosis to obtain follow-up 6-minute walk test and pulmonary function testing for  ILD.  Return in about 3 months (around 5/22/2021) for pfts, 6 mwt.

## 2021-04-22 ENCOUNTER — HOSPITAL ENCOUNTER (OUTPATIENT)
Dept: PULMONOLOGY | Facility: MEDICAL CENTER | Age: 66
End: 2021-04-22
Attending: INTERNAL MEDICINE
Payer: MEDICARE

## 2021-04-22 PROCEDURE — G0237 THERAPEUTIC PROCD STRG ENDUR: HCPCS

## 2021-04-22 PROCEDURE — 99211 OFF/OP EST MAY X REQ PHY/QHP: CPT | Mod: 25

## 2021-04-26 DIAGNOSIS — R05.9 COUGH: ICD-10-CM

## 2021-04-27 RX ORDER — BENZONATATE 100 MG/1
CAPSULE ORAL
Qty: 60 CAPSULE | Refills: 3 | Status: SHIPPED | OUTPATIENT
Start: 2021-04-27 | End: 2024-01-08

## 2021-04-27 NOTE — TELEPHONE ENCOUNTER
Have we ever prescribed this med? Yes.  If yes, what date? 11/12/20    Last OV: 02/22/21 with Dr Almaguer    Next OV: 06/09/21 with Dr Almaguer    DX: Cough (R05)    Medications:   Requested Prescriptions     Pending Prescriptions Disp Refills   • benzonatate (TESSALON) 100 MG Cap [Pharmacy Med Name: BENZONATATE 100MG CAPSULES]  3     Sig: TAKE 1 CAPSULE BY MOUTH THREE TIMES DAILY AS NEEDED FOR COUGH

## 2021-05-03 ENCOUNTER — PATIENT MESSAGE (OUTPATIENT)
Dept: SLEEP MEDICINE | Facility: MEDICAL CENTER | Age: 66
End: 2021-05-03

## 2021-05-03 ENCOUNTER — HOSPITAL ENCOUNTER (OUTPATIENT)
Dept: PULMONOLOGY | Facility: MEDICAL CENTER | Age: 66
End: 2021-05-03
Attending: NURSE PRACTITIONER
Payer: MEDICARE

## 2021-05-03 ENCOUNTER — APPOINTMENT (OUTPATIENT)
Dept: PULMONOLOGY | Facility: MEDICAL CENTER | Age: 66
End: 2021-05-03
Payer: MEDICARE

## 2021-05-03 DIAGNOSIS — R05.9 COUGH: ICD-10-CM

## 2021-05-03 DIAGNOSIS — J84.9 ILD (INTERSTITIAL LUNG DISEASE) (HCC): ICD-10-CM

## 2021-05-03 PROCEDURE — G0239 OTH RESP PROC, GROUP: HCPCS

## 2021-05-03 PROCEDURE — G0238 OTH RESP PROC, INDIV: HCPCS | Mod: XU

## 2021-05-03 PROCEDURE — G0237 THERAPEUTIC PROCD STRG ENDUR: HCPCS | Mod: XU

## 2021-05-05 ENCOUNTER — HOSPITAL ENCOUNTER (OUTPATIENT)
Dept: PULMONOLOGY | Facility: MEDICAL CENTER | Age: 66
End: 2021-05-05
Attending: NURSE PRACTITIONER
Payer: MEDICARE

## 2021-05-05 ENCOUNTER — PATIENT MESSAGE (OUTPATIENT)
Dept: SLEEP MEDICINE | Facility: MEDICAL CENTER | Age: 66
End: 2021-05-05

## 2021-05-05 ENCOUNTER — APPOINTMENT (OUTPATIENT)
Dept: PULMONOLOGY | Facility: MEDICAL CENTER | Age: 66
End: 2021-05-05
Payer: MEDICARE

## 2021-05-05 DIAGNOSIS — J84.9 ILD (INTERSTITIAL LUNG DISEASE) (HCC): ICD-10-CM

## 2021-05-05 DIAGNOSIS — R05.9 COUGH: ICD-10-CM

## 2021-05-05 PROCEDURE — G0238 OTH RESP PROC, INDIV: HCPCS | Mod: XU

## 2021-05-05 PROCEDURE — G0237 THERAPEUTIC PROCD STRG ENDUR: HCPCS | Mod: XU

## 2021-05-05 PROCEDURE — G0239 OTH RESP PROC, GROUP: HCPCS

## 2021-05-05 RX ORDER — CODEINE PHOSPHATE/GUAIFENESIN 10-100MG/5
5 LIQUID (ML) ORAL 3 TIMES DAILY PRN
Qty: 118 ML | Refills: 0 | Status: SHIPPED | OUTPATIENT
Start: 2021-05-05 | End: 2021-05-20

## 2021-05-10 ENCOUNTER — APPOINTMENT (OUTPATIENT)
Dept: PULMONOLOGY | Facility: MEDICAL CENTER | Age: 66
End: 2021-05-10
Payer: MEDICARE

## 2021-05-10 ENCOUNTER — HOSPITAL ENCOUNTER (OUTPATIENT)
Dept: PULMONOLOGY | Facility: MEDICAL CENTER | Age: 66
End: 2021-05-10
Attending: NURSE PRACTITIONER
Payer: MEDICARE

## 2021-05-10 PROCEDURE — G0239 OTH RESP PROC, GROUP: HCPCS

## 2021-05-10 PROCEDURE — G0238 OTH RESP PROC, INDIV: HCPCS | Mod: XU

## 2021-05-10 PROCEDURE — G0237 THERAPEUTIC PROCD STRG ENDUR: HCPCS | Mod: XU

## 2021-05-10 RX ORDER — CODEINE PHOSPHATE/GUAIFENESIN 10-100MG/5
5 LIQUID (ML) ORAL 3 TIMES DAILY PRN
Qty: 118 ML | Refills: 0 | Status: SHIPPED | OUTPATIENT
Start: 2021-05-10 | End: 2021-05-20

## 2021-05-12 ENCOUNTER — APPOINTMENT (OUTPATIENT)
Dept: PULMONOLOGY | Facility: MEDICAL CENTER | Age: 66
End: 2021-05-12
Payer: MEDICARE

## 2021-05-12 ENCOUNTER — APPOINTMENT (OUTPATIENT)
Dept: PULMONOLOGY | Facility: MEDICAL CENTER | Age: 66
End: 2021-05-12
Attending: NURSE PRACTITIONER
Payer: MEDICARE

## 2021-05-17 ENCOUNTER — HOSPITAL ENCOUNTER (OUTPATIENT)
Dept: PULMONOLOGY | Facility: MEDICAL CENTER | Age: 66
End: 2021-05-17
Attending: NURSE PRACTITIONER
Payer: MEDICARE

## 2021-05-17 ENCOUNTER — APPOINTMENT (OUTPATIENT)
Dept: PULMONOLOGY | Facility: MEDICAL CENTER | Age: 66
End: 2021-05-17
Payer: MEDICARE

## 2021-05-17 PROCEDURE — G0239 OTH RESP PROC, GROUP: HCPCS

## 2021-05-17 PROCEDURE — G0237 THERAPEUTIC PROCD STRG ENDUR: HCPCS | Mod: XU

## 2021-05-17 PROCEDURE — G0238 OTH RESP PROC, INDIV: HCPCS | Mod: XU

## 2021-05-19 ENCOUNTER — APPOINTMENT (OUTPATIENT)
Dept: PULMONOLOGY | Facility: MEDICAL CENTER | Age: 66
End: 2021-05-19
Payer: MEDICARE

## 2021-05-19 ENCOUNTER — HOSPITAL ENCOUNTER (OUTPATIENT)
Dept: PULMONOLOGY | Facility: MEDICAL CENTER | Age: 66
End: 2021-05-19
Attending: NURSE PRACTITIONER
Payer: MEDICARE

## 2021-05-19 PROCEDURE — G0238 OTH RESP PROC, INDIV: HCPCS | Mod: XU

## 2021-05-19 PROCEDURE — G0239 OTH RESP PROC, GROUP: HCPCS

## 2021-05-19 PROCEDURE — G0237 THERAPEUTIC PROCD STRG ENDUR: HCPCS

## 2021-05-24 ENCOUNTER — APPOINTMENT (OUTPATIENT)
Dept: PULMONOLOGY | Facility: MEDICAL CENTER | Age: 66
End: 2021-05-24
Payer: MEDICARE

## 2021-05-24 ENCOUNTER — HOSPITAL ENCOUNTER (OUTPATIENT)
Dept: PULMONOLOGY | Facility: MEDICAL CENTER | Age: 66
End: 2021-05-24
Attending: NURSE PRACTITIONER
Payer: MEDICARE

## 2021-05-24 PROCEDURE — G0237 THERAPEUTIC PROCD STRG ENDUR: HCPCS | Mod: XU

## 2021-05-24 PROCEDURE — G0239 OTH RESP PROC, GROUP: HCPCS

## 2021-05-24 PROCEDURE — G0238 OTH RESP PROC, INDIV: HCPCS

## 2021-05-26 ENCOUNTER — APPOINTMENT (OUTPATIENT)
Dept: PULMONOLOGY | Facility: MEDICAL CENTER | Age: 66
End: 2021-05-26
Payer: MEDICARE

## 2021-05-26 ENCOUNTER — HOSPITAL ENCOUNTER (OUTPATIENT)
Dept: PULMONOLOGY | Facility: MEDICAL CENTER | Age: 66
End: 2021-05-26
Attending: NURSE PRACTITIONER
Payer: MEDICARE

## 2021-05-26 PROCEDURE — G0239 OTH RESP PROC, GROUP: HCPCS

## 2021-05-26 PROCEDURE — G0237 THERAPEUTIC PROCD STRG ENDUR: HCPCS | Mod: XU

## 2021-05-26 PROCEDURE — G0238 OTH RESP PROC, INDIV: HCPCS | Mod: XU

## 2021-06-02 ENCOUNTER — HOSPITAL ENCOUNTER (OUTPATIENT)
Dept: PULMONOLOGY | Facility: MEDICAL CENTER | Age: 66
End: 2021-06-02
Attending: NURSE PRACTITIONER
Payer: MEDICARE

## 2021-06-02 ENCOUNTER — APPOINTMENT (OUTPATIENT)
Dept: PULMONOLOGY | Facility: MEDICAL CENTER | Age: 66
End: 2021-06-02
Payer: MEDICARE

## 2021-06-02 PROCEDURE — G0237 THERAPEUTIC PROCD STRG ENDUR: HCPCS

## 2021-06-02 PROCEDURE — G0238 OTH RESP PROC, INDIV: HCPCS

## 2021-06-02 PROCEDURE — G0239 OTH RESP PROC, GROUP: HCPCS

## 2021-06-07 ENCOUNTER — APPOINTMENT (OUTPATIENT)
Dept: PULMONOLOGY | Facility: MEDICAL CENTER | Age: 66
End: 2021-06-07
Payer: MEDICARE

## 2021-06-07 ENCOUNTER — HOSPITAL ENCOUNTER (OUTPATIENT)
Dept: PULMONOLOGY | Facility: MEDICAL CENTER | Age: 66
End: 2021-06-07
Attending: NURSE PRACTITIONER
Payer: MEDICARE

## 2021-06-07 PROCEDURE — G0239 OTH RESP PROC, GROUP: HCPCS

## 2021-06-07 PROCEDURE — G0238 OTH RESP PROC, INDIV: HCPCS | Mod: XU

## 2021-06-07 PROCEDURE — G0237 THERAPEUTIC PROCD STRG ENDUR: HCPCS | Mod: XU

## 2021-06-09 ENCOUNTER — NON-PROVIDER VISIT (OUTPATIENT)
Dept: SLEEP MEDICINE | Facility: MEDICAL CENTER | Age: 66
End: 2021-06-09
Payer: MEDICARE

## 2021-06-09 ENCOUNTER — HOSPITAL ENCOUNTER (OUTPATIENT)
Dept: PULMONOLOGY | Facility: MEDICAL CENTER | Age: 66
End: 2021-06-09
Attending: NURSE PRACTITIONER
Payer: MEDICARE

## 2021-06-09 ENCOUNTER — APPOINTMENT (OUTPATIENT)
Dept: PULMONOLOGY | Facility: MEDICAL CENTER | Age: 66
End: 2021-06-09
Payer: MEDICARE

## 2021-06-09 ENCOUNTER — OFFICE VISIT (OUTPATIENT)
Dept: SLEEP MEDICINE | Facility: MEDICAL CENTER | Age: 66
End: 2021-06-09
Payer: MEDICARE

## 2021-06-09 ENCOUNTER — NON-PROVIDER VISIT (OUTPATIENT)
Dept: SLEEP MEDICINE | Facility: MEDICAL CENTER | Age: 66
End: 2021-06-09
Attending: INTERNAL MEDICINE
Payer: MEDICARE

## 2021-06-09 VITALS
HEART RATE: 85 BPM | RESPIRATION RATE: 18 BRPM | WEIGHT: 180 LBS | OXYGEN SATURATION: 93 % | HEIGHT: 70 IN | BODY MASS INDEX: 25.77 KG/M2 | DIASTOLIC BLOOD PRESSURE: 60 MMHG | TEMPERATURE: 98.4 F | SYSTOLIC BLOOD PRESSURE: 126 MMHG

## 2021-06-09 VITALS — BODY MASS INDEX: 25.83 KG/M2 | WEIGHT: 180 LBS

## 2021-06-09 DIAGNOSIS — J84.9 ILD (INTERSTITIAL LUNG DISEASE) (HCC): ICD-10-CM

## 2021-06-09 DIAGNOSIS — R05.9 COUGH: ICD-10-CM

## 2021-06-09 DIAGNOSIS — J96.11 CHRONIC RESPIRATORY FAILURE WITH HYPOXIA (HCC): ICD-10-CM

## 2021-06-09 DIAGNOSIS — M06.9 RHEUMATOID ARTHRITIS, INVOLVING UNSPECIFIED SITE, UNSPECIFIED WHETHER RHEUMATOID FACTOR PRESENT (HCC): ICD-10-CM

## 2021-06-09 DIAGNOSIS — R91.8 OTHER NONSPECIFIC ABNORMAL FINDING OF LUNG FIELD: ICD-10-CM

## 2021-06-09 DIAGNOSIS — K21.9 GASTROESOPHAGEAL REFLUX DISEASE, UNSPECIFIED WHETHER ESOPHAGITIS PRESENT: ICD-10-CM

## 2021-06-09 PROCEDURE — 94729 DIFFUSING CAPACITY: CPT | Performed by: INTERNAL MEDICINE

## 2021-06-09 PROCEDURE — 94618 PULMONARY STRESS TESTING: CPT | Performed by: INTERNAL MEDICINE

## 2021-06-09 PROCEDURE — 99214 OFFICE O/P EST MOD 30 MIN: CPT | Mod: 25 | Performed by: INTERNAL MEDICINE

## 2021-06-09 PROCEDURE — G0237 THERAPEUTIC PROCD STRG ENDUR: HCPCS | Mod: XU

## 2021-06-09 PROCEDURE — G0238 OTH RESP PROC, INDIV: HCPCS | Mod: XU

## 2021-06-09 PROCEDURE — 94010 BREATHING CAPACITY TEST: CPT | Performed by: INTERNAL MEDICINE

## 2021-06-09 PROCEDURE — G0239 OTH RESP PROC, GROUP: HCPCS

## 2021-06-09 PROCEDURE — 94726 PLETHYSMOGRAPHY LUNG VOLUMES: CPT | Performed by: INTERNAL MEDICINE

## 2021-06-09 RX ORDER — CODEINE PHOSPHATE AND GUAIFENESIN 10; 100 MG/5ML; MG/5ML
5 SOLUTION ORAL EVERY 4 HOURS PRN
Qty: 420 ML | Refills: 0 | Status: SHIPPED | OUTPATIENT
Start: 2021-06-09 | End: 2021-07-09

## 2021-06-09 ASSESSMENT — PULMONARY FUNCTION TESTS
FVC_PERCENT_PREDICTED: 46
FEV1: 1.93
FEV1_LLN: 2.84
FVC_LLN: 3.71
FVC_PREDICTED: 4.45
FEV1/FVC: 93
FEV1/FVC_PREDICTED: 77
FEV1/FVC_PERCENT_LLN: 64
FEV1/FVC_PERCENT_PREDICTED: 122
FEV1/FVC_PERCENT_PREDICTED: 76
FEV1/FVC_PERCENT_PREDICTED: 120
FEV1/FVC: 93
FVC: 2.08
FEV1_PERCENT_PREDICTED: 56
FEV1_PREDICTED: 3.4

## 2021-06-09 ASSESSMENT — ENCOUNTER SYMPTOMS
CONSTIPATION: 0
SORE THROAT: 0
NAUSEA: 0
MYALGIAS: 0
TREMORS: 0
VOMITING: 0
PALPITATIONS: 0
DEPRESSION: 0
SINUS PAIN: 0
STRIDOR: 0
DIZZINESS: 0
PND: 0
BLURRED VISION: 0
FOCAL WEAKNESS: 0
DIARRHEA: 0
ORTHOPNEA: 0
HEMOPTYSIS: 0
FEVER: 0
CLAUDICATION: 0
BACK PAIN: 0
DIAPHORESIS: 0
EYE DISCHARGE: 0
EYE PAIN: 0
HEARTBURN: 0
DOUBLE VISION: 0
WEAKNESS: 0
PHOTOPHOBIA: 0
EYE REDNESS: 0
HEADACHES: 0
COUGH: 1
WHEEZING: 0
CHILLS: 0
NECK PAIN: 0
WEIGHT LOSS: 0
FALLS: 0
ABDOMINAL PAIN: 0
SPEECH CHANGE: 0
SPUTUM PRODUCTION: 0
SHORTNESS OF BREATH: 0

## 2021-06-09 ASSESSMENT — 6 MINUTE WALK TEST (6MWT)
PERCEIVED FATIGUE AT 6 MIN: 0
PERCEIVED BREATHLESSNESS AT 4 MIN: 2
SAO2 AT 1 MIN: 86
SAO2 AT 6 MIN: 90
SAO2 AT 4 MIN: 92
SAO2 AT 5 MIN: 90
PERCEIVED BREATHLESSNESS AT 3 MIN: 2
HEART RATE AT 6 MIN: 101
SAO2 AT 2 MIN: 91
SAO2 AT 2 MIN: 84
PERCEIVED FATIGUE AT 5 MIN: 0
BLOOD PRESSURE AT 1 MIN: 126/64
PERCEIVED BREATHLESSNESS AT 2 MIN: 2
PERCEIVED BREATHLESSNESS AT 1 MIN: 2
PERCEIVED FATIGUE AT 2 MIN: 0
PERCEIVED BREATHLESSNESS AT 6 MIN: 3
PERCEIVED BREATHLESSNESS AT 1 MIN: 3
HEART RATE AT 3 MIN: 96
HEART RATE: 82
PERCEIVED FATIGUE AT 3 MIN: 0
PERCEIVED BREATHLESSNESS AT 5 MIN: 2
NUMBER OF RESTS: 0
SITTING BLOOD PRESSURE: 126/64
PERCEIVED FATIGUE AT 2 MIN: 0
COMMENTS: ADDED 02 2 LPM
PERCEIVED FATIGUE AT 1 MIN: 0
AMBULATES WITH O2: WITHOUT O2
BLOOD PRESSURE: RIGHT ARM
PERCEIVED FATIGUE AT 4 MIN: 0
PERCEIVED FATIGUE AT 1 MIN: 0
HEART RATE AT 2 MIN: 98
HEART RATE AT 1 MIN: 98
PERCEIVED BREATHLESSNESS AT 2 MIN: 3
PERCENT OF NORMAL WALKED: 58
TOTAL REST TIME: 0
HEART RATE AT 4 MIN: 96
SAO2 AT 1 MIN: 92
HEART RATE AT 5 MIN: 101
SAO2 AT 3 MIN: 93
HEART RATE AT 2 MIN: 96
O2 SAT PERCENT ROOM AIR: 94
HEART RATE AT 1 MIN: 94

## 2021-06-09 NOTE — PROCEDURES
Technician: RANI Rhodes    Technician Comment:  Good patient effort & cooperation.  The results of this test meet the ATS/ERS standards for acceptability & reproducibility.  Test was performed on the Ancera Body Plethysmograph-Elite DX system.  Predicted values were GLI-2012 for spirometry, GLI-2017 for DLCO, ITS for Lung Volumes.  The DLCO was corrected for Hgb of 13.9.      Interpretation:  Baseline spirometry shows restrictive airways with FEV1/FVC ratio of 93 and FVC of 2.08 L or 46% predicted.  Bronchodilator testing was not performed.  Total lung capacity reduced at 3.2 L or 45% predicted.  Diffusion capacity reduced at 65% predicted.  Manera function testing shows restrictive defect with reduced DLCO consistent with stated diagnosis of interstitial lung disease.

## 2021-06-09 NOTE — PROGRESS NOTES
Chief Complaint   Patient presents with   • Follow-Up     ILD last seen 2/22/21    • Results     PFT, 6MW 6/9/21          HPI: This patient is a 65 y.o. male whom is followed in our clinic for CTD associated ILD last seen by me on 2/22/21. Past medical history includes hypertension, dyslipidemia, gastroesophageal reflux disease and seronegative RA followed by Dr. Perkins.  He is a former tobacco user with less than 20-pack-year history and quit 1995.  He did work as  for both the [employer] and in the [employer].  No family history of autoimmune disease.  Patient is currently on sulfasalazine and hydroxychloroquine for RA but has been on Humira and leflunomide in the past.  He is followed by ID in [place] and on suppressive antibiotics due to recurrent pneumonia but based on hx these episodes sound more c/w recurrent aspiration. He did apparently grow serratia from blood clx during a hospitalization in July of last year. CT chest from March of this year confirmed b/l, lower lobe predominant fibrotic lung disease with honecombing at the bases. He was started on Ofev antifibrotic therapy for CTD associated ILD in July of last year and prior to establishing care with me.  pulmonary function testing on September 4 which showed FVC of 2.56 L or 55% predicted, total lung capacity of 3.65 L or 52% predicted and DLCO of 80% predicted.  He did desaturate during 6-minute walk test to in a deer of 88%.  We have not started supplemental oxygen but he has been participating in pulmonary rehab and updated PFTs from today show decline in FVC to 2.08L, TLC to 3.2 L and DLCO of 65% predicted. He desaturated after his first minute of walking and required 2LPM to maintain SpO2>89%. His main issue has been cough and right now he is alternating tesslon perles with guaifenesin with codeine for this. He likely has significant GERD and I suspect that is the source of recurrent PNA.  He has had to decrease Ofev dose to 100 mg due to GI  side effects.     Past Medical History:   Diagnosis Date   • Hyperlipidemia    • Hypertension    • Shortness of breath    • Sleep apnea    • Wears glasses        Social History     Socioeconomic History   • Marital status:      Spouse name: Not on file   • Number of children: Not on file   • Years of education: Not on file   • Highest education level: Not on file   Occupational History   • Not on file   Tobacco Use   • Smoking status: Former Smoker     Packs/day: 0.50     Types: Cigarettes     Start date:      Quit date:      Years since quittin.4   • Smokeless tobacco: Never Used   Vaping Use   • Vaping Use: Never used   Substance and Sexual Activity   • Alcohol use: Not Currently   • Drug use: Yes     Types: Marijuana, Oral     Comment: gummies    • Sexual activity: Not on file   Other Topics Concern   • Not on file   Social History Narrative   • Not on file     Social Determinants of Health     Financial Resource Strain:    • Difficulty of Paying Living Expenses:    Food Insecurity:    • Worried About Running Out of Food in the Last Year:    • Ran Out of Food in the Last Year:    Transportation Needs:    • Lack of Transportation (Medical):    • Lack of Transportation (Non-Medical):    Physical Activity:    • Days of Exercise per Week:    • Minutes of Exercise per Session:    Stress:    • Feeling of Stress :    Social Connections:    • Frequency of Communication with Friends and Family:    • Frequency of Social Gatherings with Friends and Family:    • Attends [employer] Services:    • Active Member of Clubs or Organizations:    • Attends Club or Organization Meetings:    • Marital Status:    Intimate Partner Violence:    • Fear of Current or Ex-Partner:    • Emotionally Abused:    • Physically Abused:    • Sexually Abused:        History reviewed. No pertinent family history.    Current Outpatient Medications on File Prior to Visit   Medication Sig Dispense Refill   • benzonatate (TESSALON) 100  MG Cap TAKE 1 CAPSULE BY MOUTH THREE TIMES DAILY AS NEEDED FOR COUGH 60 capsule 3   • azithromycin (ZITHROMAX) 500 MG tablet      • amLODIPine (NORVASC) 5 MG Tab Take 10 mg by mouth every day. Take 1 tablet by mouth every day     • ezetimibe (ZETIA) 10 MG Tab TK 1 T PO ONE TIME D     • sulfamethoxazole-trimethoprim (BACTRIM DS) 800-160 MG tablet Take  by mouth. Take 1 tablet by mouth two times a day     • Nintedanib Esylate (OFEV) 100 MG Cap Take 100 mg by mouth 2 Times a Day. 60 Cap 11   • omeprazole (PRILOSEC) 20 MG delayed-release capsule Take 20 mg by mouth every day.     • albuterol (PROAIR HFA) 108 (90 Base) MCG/ACT Aero Soln inhalation aerosol Inhale 2 Puffs by mouth every 6 hours as needed for Shortness of Breath.     • albuterol (PROVENTIL) 2.5mg/3ml Nebu Soln solution for nebulization 2.5 mg by Nebulization route every four hours as needed for Shortness of Breath.     • Probiotic Product (PROBIOTIC DAILY PO) Take 1 Cap by mouth every day.     • niacin 500 MG Tab Take 500 mg by mouth every evening.     • albuterol (PROVENTIL) 2.5mg/3ml Nebu Soln solution for nebulization Take 3 mL by nebulization 2 times a day as needed for Shortness of Breath. 60 Bullet 3   • senna-docusate (PERICOLACE OR SENOKOT S) 8.6-50 MG Tab Take 1 Tab by mouth every day. Prn for constipation 30 Tab 0   • methocarbamol (ROBAXIN) 750 MG Tab Take 1 Tab by mouth 3 times a day. For spams 90 Tab 0   • metronidazole (FLAGYL) 500 MG Tab Take 500 mg by mouth 3 times a day. Started 10 day course on 10/27.     • ciprofloxacin (CIPRO) 500 MG Tab Take 500 mg by mouth every 12 hours. Started 10 day course on 10/27.     • simvastatin (ZOCOR) 40 MG Tab Take 40 mg by mouth every day.     • leflunomide (ARAVA) 20 MG Tab Take 20 mg by mouth every day.     • lisinopril (PRINIVIL) 10 MG Tab Take 10 mg by mouth every day.     • gemfibrozil (LOPID) 600 MG Tab Take 600 mg by mouth 2 times a day.     • gabapentin (NEURONTIN) 100 MG Cap Take 100 mg by mouth 2  "Times a Day.     • ranitidine (ZANTAC) 150 MG Tab Take 150 mg by mouth 2 times a day.       No current facility-administered medications on file prior to visit.       Tape and Other drug      ROS:   Review of Systems   Constitutional: Negative for chills, diaphoresis, fever, malaise/fatigue and weight loss.   HENT: Negative for congestion, ear discharge, ear pain, hearing loss, nosebleeds, sinus pain, sore throat and tinnitus.    Eyes: Negative for blurred vision, double vision, photophobia, pain, discharge and redness.   Respiratory: Positive for cough. Negative for hemoptysis, sputum production, shortness of breath, wheezing and stridor.    Cardiovascular: Negative for chest pain, palpitations, orthopnea, claudication, leg swelling and PND.   Gastrointestinal: Negative for abdominal pain, constipation, diarrhea, heartburn, nausea and vomiting.   Genitourinary: Negative for dysuria and urgency.   Musculoskeletal: Negative for back pain, falls, joint pain, myalgias and neck pain.   Skin: Negative for itching and rash.   Neurological: Negative for dizziness, tremors, speech change, focal weakness, weakness and headaches.   Endo/Heme/Allergies: Negative for environmental allergies.   Psychiatric/Behavioral: Negative for depression.       /60 (BP Location: Right arm, Patient Position: Sitting, BP Cuff Size: Adult)   Pulse 85   Temp 36.9 °C (98.4 °F) (Temporal)   Resp 18   Ht 1.778 m (5' 10\")   Wt 81.6 kg (180 lb)   SpO2 93%   Physical Exam  Vitals reviewed.   Constitutional:       General: He is not in acute distress.     Appearance: Normal appearance.   HENT:      Head: Normocephalic and atraumatic.      Right Ear: External ear normal.      Left Ear: External ear normal.      Nose: Nose normal. No congestion.      Mouth/Throat:      Mouth: Mucous membranes are moist.      Pharynx: Oropharynx is clear. No oropharyngeal exudate.   Eyes:      General: No scleral icterus.     Extraocular Movements: Extraocular " movements intact.      Conjunctiva/sclera: Conjunctivae normal.      Pupils: Pupils are equal, round, and reactive to light.   Cardiovascular:      Rate and Rhythm: Normal rate and regular rhythm.      Heart sounds: Normal heart sounds. No murmur heard.   No gallop.    Pulmonary:      Effort: Pulmonary effort is normal. No respiratory distress.      Breath sounds: No wheezing.      Comments: Fine, early inspiratory crackles b/l 50% up posterior lung fields  Abdominal:      General: There is no distension.      Palpations: Abdomen is soft.   Musculoskeletal:         General: Normal range of motion.      Right lower leg: No edema.      Left lower leg: No edema.   Skin:     General: Skin is warm and dry.      Findings: No rash.   Neurological:      Mental Status: He is alert and oriented to person, place, and time.      Cranial Nerves: No cranial nerve deficit.   Psychiatric:         Mood and Affect: Mood normal.         Behavior: Behavior normal.         PFTs as reviewed by me personally: as per hPI    Imaging as reviewed by me personally:  As per hPI    Assessment:  1. ILD (interstitial lung disease) ([facility])  Exercise Test for Bronchospasm / 6-Minute Walk    DME O2 New Set Up   2. Other nonspecific abnormal finding of lung field   Exercise Test for Bronchospasm / 6-Minute Walk   3. Chronic respiratory failure with hypoxia (HCC)  DME O2 New Set Up   4. Cough  guaifenesin-codeine (ROBITUSSIN AC) Solution oral solution   5. Rheumatoid arthritis, involving unspecified site, unspecified whether rheumatoid factor present ([facility])     6. Gastroesophageal reflux disease, unspecified whether esophagitis present         Plan:  1. 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 He is on Ofev and right now we have seen decline in PFTs and increased need for O2. NO clear flair of RA to warrant immunosuppressants. We will try to get him to therapeutic dose of Ofev at 150 mg BID and continue pulmonary rehab.  Start supplemental O2 at 2LPM with activity and at night. LFTs q 3 mos. GERD prevention as per below.  2. Duplicate dx; see above.  3. 2/2 ILD. O2 needs have increased. Pt participating in pulmonary rehab. Start 2LPM with activity.  4. LiKely 2/2 ILD but I suspect also GERD. We discussed lifestyle modifications, elevating HOB and continue PPI  5. Followed by rheum and ILD presumed fibrotic NSIP in the setting of RA. No e/o RA flair.   6. See above. I do  Think this is signficant for Mr. Bender and likely made worse by fibrotic lung disease but also retrograde aspiration can worsen lung fx.   Return in about 3 months (around 9/9/2021) for LFTs.

## 2021-06-09 NOTE — PROCEDURES
Test started on Room Air  In Minute 2 I added 02 to 2 lpm patient finished test on 2 lpm 02.    Interpretation;  There is significant desaturation from 94% on room air to a laura of 84% at 2 minutes.  Patient required 2 L of supplemental oxygen to maintain SPO2 at 90% or greater.  Distance walked was 1080 feet or 58% predicted.  Recommend supplemental oxygen at 2 L/min with activity.  Exercise tolerance is reduced.

## 2021-06-14 ENCOUNTER — PATIENT MESSAGE (OUTPATIENT)
Dept: SLEEP MEDICINE | Facility: MEDICAL CENTER | Age: 66
End: 2021-06-14

## 2021-06-14 ENCOUNTER — APPOINTMENT (OUTPATIENT)
Dept: PULMONOLOGY | Facility: MEDICAL CENTER | Age: 66
End: 2021-06-14
Attending: NURSE PRACTITIONER
Payer: MEDICARE

## 2021-06-14 ENCOUNTER — APPOINTMENT (OUTPATIENT)
Dept: PULMONOLOGY | Facility: MEDICAL CENTER | Age: 66
End: 2021-06-14
Payer: MEDICARE

## 2021-06-16 ENCOUNTER — APPOINTMENT (OUTPATIENT)
Dept: PULMONOLOGY | Facility: MEDICAL CENTER | Age: 66
End: 2021-06-16
Payer: MEDICARE

## 2021-06-16 ENCOUNTER — HOSPITAL ENCOUNTER (OUTPATIENT)
Dept: PULMONOLOGY | Facility: MEDICAL CENTER | Age: 66
End: 2021-06-16
Attending: NURSE PRACTITIONER
Payer: MEDICARE

## 2021-06-16 PROCEDURE — G0238 OTH RESP PROC, INDIV: HCPCS | Mod: XU

## 2021-06-16 PROCEDURE — G0237 THERAPEUTIC PROCD STRG ENDUR: HCPCS | Mod: XU

## 2021-06-16 PROCEDURE — G0239 OTH RESP PROC, GROUP: HCPCS

## 2021-06-21 ENCOUNTER — HOSPITAL ENCOUNTER (OUTPATIENT)
Dept: PULMONOLOGY | Facility: MEDICAL CENTER | Age: 66
End: 2021-06-21
Attending: NURSE PRACTITIONER
Payer: MEDICARE

## 2021-06-21 ENCOUNTER — APPOINTMENT (OUTPATIENT)
Dept: PULMONOLOGY | Facility: MEDICAL CENTER | Age: 66
End: 2021-06-21
Payer: MEDICARE

## 2021-06-21 PROCEDURE — G0237 THERAPEUTIC PROCD STRG ENDUR: HCPCS | Mod: XU

## 2021-06-21 PROCEDURE — G0238 OTH RESP PROC, INDIV: HCPCS | Mod: XU

## 2021-06-21 PROCEDURE — G0239 OTH RESP PROC, GROUP: HCPCS

## 2021-06-23 ENCOUNTER — APPOINTMENT (OUTPATIENT)
Dept: PULMONOLOGY | Facility: MEDICAL CENTER | Age: 66
End: 2021-06-23
Payer: MEDICARE

## 2021-06-23 ENCOUNTER — HOSPITAL ENCOUNTER (OUTPATIENT)
Dept: PULMONOLOGY | Facility: MEDICAL CENTER | Age: 66
End: 2021-06-23
Attending: NURSE PRACTITIONER
Payer: MEDICARE

## 2021-06-23 PROCEDURE — G0239 OTH RESP PROC, GROUP: HCPCS

## 2021-06-23 PROCEDURE — G0237 THERAPEUTIC PROCD STRG ENDUR: HCPCS | Mod: XU

## 2021-06-23 PROCEDURE — G0238 OTH RESP PROC, INDIV: HCPCS | Mod: XU

## 2021-06-28 ENCOUNTER — HOSPITAL ENCOUNTER (OUTPATIENT)
Dept: PULMONOLOGY | Facility: MEDICAL CENTER | Age: 66
End: 2021-06-28
Attending: NURSE PRACTITIONER
Payer: MEDICARE

## 2021-06-28 ENCOUNTER — APPOINTMENT (OUTPATIENT)
Dept: PULMONOLOGY | Facility: MEDICAL CENTER | Age: 66
End: 2021-06-28
Payer: MEDICARE

## 2021-06-28 PROCEDURE — G0238 OTH RESP PROC, INDIV: HCPCS | Mod: XU

## 2021-06-28 PROCEDURE — G0239 OTH RESP PROC, GROUP: HCPCS

## 2021-06-28 PROCEDURE — G0237 THERAPEUTIC PROCD STRG ENDUR: HCPCS

## 2021-06-30 ENCOUNTER — APPOINTMENT (OUTPATIENT)
Dept: PULMONOLOGY | Facility: MEDICAL CENTER | Age: 66
End: 2021-06-30
Payer: MEDICARE

## 2021-06-30 ENCOUNTER — HOSPITAL ENCOUNTER (OUTPATIENT)
Dept: PULMONOLOGY | Facility: MEDICAL CENTER | Age: 66
End: 2021-06-30
Attending: INTERNAL MEDICINE
Payer: MEDICARE

## 2021-06-30 PROCEDURE — G0237 THERAPEUTIC PROCD STRG ENDUR: HCPCS | Mod: XU

## 2021-06-30 PROCEDURE — G0239 OTH RESP PROC, GROUP: HCPCS

## 2021-06-30 PROCEDURE — G0238 OTH RESP PROC, INDIV: HCPCS | Mod: XU

## 2021-07-07 ENCOUNTER — APPOINTMENT (OUTPATIENT)
Dept: PULMONOLOGY | Facility: MEDICAL CENTER | Age: 66
End: 2021-07-07
Attending: INTERNAL MEDICINE
Payer: MEDICARE

## 2021-07-07 ENCOUNTER — APPOINTMENT (OUTPATIENT)
Dept: PULMONOLOGY | Facility: MEDICAL CENTER | Age: 66
End: 2021-07-07
Payer: MEDICARE

## 2021-07-12 ENCOUNTER — HOSPITAL ENCOUNTER (OUTPATIENT)
Dept: PULMONOLOGY | Facility: MEDICAL CENTER | Age: 66
End: 2021-07-12
Attending: INTERNAL MEDICINE
Payer: MEDICARE

## 2021-07-12 PROCEDURE — G0237 THERAPEUTIC PROCD STRG ENDUR: HCPCS | Mod: XU

## 2021-07-12 PROCEDURE — G0239 OTH RESP PROC, GROUP: HCPCS

## 2021-07-12 PROCEDURE — G0238 OTH RESP PROC, INDIV: HCPCS | Mod: XU

## 2021-07-14 ENCOUNTER — HOSPITAL ENCOUNTER (OUTPATIENT)
Dept: PULMONOLOGY | Facility: MEDICAL CENTER | Age: 66
End: 2021-07-14
Attending: INTERNAL MEDICINE
Payer: MEDICARE

## 2021-07-14 PROCEDURE — G0239 OTH RESP PROC, GROUP: HCPCS

## 2021-07-14 PROCEDURE — G0237 THERAPEUTIC PROCD STRG ENDUR: HCPCS | Mod: XU

## 2021-07-14 PROCEDURE — G0238 OTH RESP PROC, INDIV: HCPCS | Mod: XU

## 2021-07-23 ENCOUNTER — PATIENT MESSAGE (OUTPATIENT)
Dept: SLEEP MEDICINE | Facility: MEDICAL CENTER | Age: 66
End: 2021-07-23

## 2021-07-23 DIAGNOSIS — J84.9 ILD (INTERSTITIAL LUNG DISEASE) (HCC): ICD-10-CM

## 2021-07-26 RX ORDER — NINTEDANIB 150 MG/1
150 CAPSULE ORAL 2 TIMES DAILY
Qty: 60 CAPSULE | Refills: 11 | Status: SHIPPED | OUTPATIENT
Start: 2021-07-26 | End: 2022-06-27

## 2021-07-29 ENCOUNTER — TELEPHONE (OUTPATIENT)
Dept: SLEEP MEDICINE | Facility: MEDICAL CENTER | Age: 66
End: 2021-07-29

## 2021-08-19 NOTE — TELEPHONE ENCOUNTER
Received another PA request. I did pull up original Key that was sent to me and it stated it was denied; I have tried again and submitted

## 2021-09-21 ENCOUNTER — TELEPHONE (OUTPATIENT)
Dept: SLEEP MEDICINE | Facility: MEDICAL CENTER | Age: 66
End: 2021-09-21

## 2021-09-22 ENCOUNTER — TELEPHONE (OUTPATIENT)
Dept: SLEEP MEDICINE | Facility: MEDICAL CENTER | Age: 66
End: 2021-09-22

## 2021-09-27 ENCOUNTER — TELEPHONE (OUTPATIENT)
Dept: SLEEP MEDICINE | Facility: MEDICAL CENTER | Age: 66
End: 2021-09-27

## 2021-09-27 NOTE — TELEPHONE ENCOUNTER
"[facility] called the same day as Noni's note. I did not see anything signed for return in Dr Almaguer's out box. Jasmin left voice message and I called and asked that they re-fax \"just in case\" so I can scan into the media tab Blank paperwork. Perhaps this message and paperwork just laurie-crossed. Mary to re-fax their request. Our fax number was given.  "

## 2021-10-04 ENCOUNTER — OFFICE VISIT (OUTPATIENT)
Dept: SLEEP MEDICINE | Facility: MEDICAL CENTER | Age: 66
End: 2021-10-04
Payer: MEDICARE

## 2021-10-04 VITALS
SYSTOLIC BLOOD PRESSURE: 120 MMHG | HEART RATE: 87 BPM | BODY MASS INDEX: 26.25 KG/M2 | HEIGHT: 70 IN | WEIGHT: 183.38 LBS | DIASTOLIC BLOOD PRESSURE: 86 MMHG | OXYGEN SATURATION: 94 % | RESPIRATION RATE: 32 BRPM

## 2021-10-04 DIAGNOSIS — R94.2 ABNORMAL RESULTS OF PULMONARY FUNCTION STUDIES: ICD-10-CM

## 2021-10-04 DIAGNOSIS — J84.9 INTERSTITIAL PULMONARY DISEASE (HCC): ICD-10-CM

## 2021-10-04 DIAGNOSIS — J84.9 ILD (INTERSTITIAL LUNG DISEASE) (HCC): ICD-10-CM

## 2021-10-04 PROCEDURE — 99215 OFFICE O/P EST HI 40 MIN: CPT | Performed by: INTERNAL MEDICINE

## 2021-10-04 RX ORDER — PREDNISONE 10 MG/1
5 TABLET ORAL
COMMUNITY
Start: 2021-07-26

## 2021-10-04 ASSESSMENT — ENCOUNTER SYMPTOMS
SPUTUM PRODUCTION: 0
HEMOPTYSIS: 0
SHORTNESS OF BREATH: 1
PALPITATIONS: 0
HEADACHES: 0
HEARTBURN: 1
DIZZINESS: 0
WHEEZING: 0
COUGH: 1

## 2021-10-05 NOTE — PROGRESS NOTES
Pulmonary Clinic Note    Chief Complaint:  Chief Complaint   Patient presents with   • Follow-Up     ILD. Last seen 06/09/21         HPI:   This patient is a 66 y.o. male whom is followed in our clinic for ILD related to his rheumatoid arthritis.  He is followed by Dr. Prekins from rheumatology and is currently on sulfasalazine and hydroxychloroquine.  He has tried other regimens in the past.  He is followed by ID in [place] for recurrent pneumonias which were previously considered to be secondary to aspiration, he continues on Bactrim and azithromycin chronically for these infections.  On his CT scans he has worsening lower lobe fibrotic lung disease with development of honeycombing on recent CTs.  His last CT chest was in March 2020.  His pulmonary function tests have also declined over the past few years.  He is now on oxygen for some part of almost every day and almost always with exertion.  We talked about turning it down to 1 L instead of completely off as he is going from 0 to 2 L and back again quite frequently during the day.  He is a former 20-pack-year smoker but quit in 1995.    Due to his fibrotic progression he was started on Ofev at 150 mg twice daily.  He was dropped to 100 mg twice daily due to GI intolerance however he has now able to tolerate 150 mg twice a day again with only minimal diarrhea which is tolerable to him and no other significant side effects.  States he has some dependent edema in his ankles from time to time.  He states he recently had an echo at [facility], showed me the results which reveal right ventricular dilation and reduced right ventricular function which is mild.  Pulmonary pressures were unable to be estimated.    He currently manages his cough with Tessalon Perles and guaifenesin, however these medications have decreased efficacy as of late.  He also uses nebulized albuterol when he has his cough with his oxygen which sometimes helps him get  through his cough attacks.  His cough is dry he does not produce sputum.  He does experience postnasal drip and is currently on triple therapy with Zyrtec, Flonase and nasal saline.  He also self medicates on occasion with 10 mg of prednisone which he has at home which he takes sometimes when his cough is really severe.    Past Medical History:   Diagnosis Date   • Hyperlipidemia    • Hypertension    • Shortness of breath    • Sleep apnea    • Wears glasses        Social History     Socioeconomic History   • Marital status:      Spouse name: Not on file   • Number of children: Not on file   • Years of education: Not on file   • Highest education level: Not on file   Occupational History   • Not on file   Tobacco Use   • Smoking status: Former Smoker     Packs/day: 0.50     Types: Cigarettes     Start date:      Quit date:      Years since quittin.7   • Smokeless tobacco: Never Used   Vaping Use   • Vaping Use: Never used   Substance and Sexual Activity   • Alcohol use: Not Currently   • Drug use: Yes     Types: Marijuana, Oral     Comment: gummies    • Sexual activity: Not on file   Other Topics Concern   • Not on file   Social History Narrative   • Not on file     Social Determinants of Health     Financial Resource Strain:    • Difficulty of Paying Living Expenses:    Food Insecurity:    • Worried About Running Out of Food in the Last Year:    • Ran Out of Food in the Last Year:    Transportation Needs:    • Lack of Transportation (Medical):    • Lack of Transportation (Non-Medical):    Physical Activity:    • Days of Exercise per Week:    • Minutes of Exercise per Session:    Stress:    • Feeling of Stress :    Social Connections:    • Frequency of Communication with Friends and Family:    • Frequency of Social Gatherings with Friends and Family:    • Attends [employer] Services:    • Active Member of Clubs or Organizations:    • Attends Club or Organization Meetings:    • Marital Status:     Intimate Partner Violence:    • Fear of Current or Ex-Partner:    • Emotionally Abused:    • Physically Abused:    • Sexually Abused:           No family history on file.    Current Outpatient Medications on File Prior to Visit   Medication Sig Dispense Refill   • predniSONE (DELTASONE) 10 MG Tab Take 10 mg by mouth. PRN     • Nintedanib Esylate (OFEV) 150 MG Cap Take 150 mg by mouth 2 times a day. 60 capsule 11   • benzonatate (TESSALON) 100 MG Cap TAKE 1 CAPSULE BY MOUTH THREE TIMES DAILY AS NEEDED FOR COUGH 60 capsule 3   • azithromycin (ZITHROMAX) 500 MG tablet      • amLODIPine (NORVASC) 5 MG Tab Take 10 mg by mouth every day. Take 1 tablet by mouth every day     • ezetimibe (ZETIA) 10 MG Tab TK 1 T PO ONE TIME D     • sulfamethoxazole-trimethoprim (BACTRIM DS) 800-160 MG tablet Take  by mouth. Take 1 tablet by mouth two times a day     • albuterol (PROVENTIL) 2.5mg/3ml Nebu Soln solution for nebulization Take 3 mL by nebulization 2 times a day as needed for Shortness of Breath. 60 Bullet 3   • Nintedanib Esylate (OFEV) 100 MG Cap Take 100 mg by mouth 2 Times a Day. 60 Cap 11   • omeprazole (PRILOSEC) 20 MG delayed-release capsule Take 20 mg by mouth every day.     • albuterol (PROAIR HFA) 108 (90 Base) MCG/ACT Aero Soln inhalation aerosol Inhale 2 Puffs by mouth every 6 hours as needed for Shortness of Breath.     • albuterol (PROVENTIL) 2.5mg/3ml Nebu Soln solution for nebulization 2.5 mg by Nebulization route every four hours as needed for Shortness of Breath.     • Probiotic Product (PROBIOTIC DAILY PO) Take 1 Cap by mouth every day.     • niacin 500 MG Tab Take 500 mg by mouth every evening.     • senna-docusate (PERICOLACE OR SENOKOT S) 8.6-50 MG Tab Take 1 Tab by mouth every day. Prn for constipation 30 Tab 0   • methocarbamol (ROBAXIN) 750 MG Tab Take 1 Tab by mouth 3 times a day. For spams 90 Tab 0   • metronidazole (FLAGYL) 500 MG Tab Take 500 mg by mouth 3 times a day. Started 10 day course on  "10/27.     • ciprofloxacin (CIPRO) 500 MG Tab Take 500 mg by mouth every 12 hours. Started 10 day course on 10/27.     • simvastatin (ZOCOR) 40 MG Tab Take 40 mg by mouth every day.     • leflunomide (ARAVA) 20 MG Tab Take 20 mg by mouth every day.     • lisinopril (PRINIVIL) 10 MG Tab Take 10 mg by mouth every day.     • gemfibrozil (LOPID) 600 MG Tab Take 600 mg by mouth 2 times a day.     • gabapentin (NEURONTIN) 100 MG Cap Take 100 mg by mouth 2 Times a Day.     • ranitidine (ZANTAC) 150 MG Tab Take 150 mg by mouth 2 times a day.       No current facility-administered medications on file prior to visit.       Tape and Other drug      ROS:   Review of Systems   Constitutional: Negative for malaise/fatigue.   Respiratory: Positive for cough and shortness of breath. Negative for hemoptysis, sputum production and wheezing.    Cardiovascular: Positive for leg swelling. Negative for chest pain and palpitations.   Gastrointestinal: Positive for heartburn.   Neurological: Negative for dizziness and headaches.       Vitals:  /86 (BP Location: Left arm, Patient Position: Sitting, BP Cuff Size: Adult)   Pulse 87   Resp (!) 32   Ht 1.778 m (5' 10\")   Wt 83.2 kg (183 lb 6 oz)   SpO2 94%     Physical Exam:  Physical Exam  Constitutional:       Appearance: Normal appearance.   HENT:      Head: Normocephalic and atraumatic.   Cardiovascular:      Rate and Rhythm: Normal rate and regular rhythm.      Comments: Loud P2  Pulmonary:      Breath sounds: No wheezing or rhonchi.      Comments: Crackles bilaterally at bases, mild dyspnea with conversation  Chest:      Chest wall: No tenderness.   Abdominal:      General: Abdomen is flat.   Musculoskeletal:         General: Swelling present. No tenderness or deformity.   Skin:     General: Skin is warm and dry.   Neurological:      General: No focal deficit present.      Mental Status: He is alert.         PFTs as reviewed by me personally:  6/9/21:      6MWT 6/9/21: 1080 " feet/ 360m, desaturated to 84% and required 2L of oxygen to finish the study. HR 94 -> 101.     Imaging as reviewed by me personally:    3/29/2020:        CT with honeycombing developing at the bases with traction bronchiectasis.     7/22/2020:    CT with mildly worsening honeycombing at the bases and traction bronchiectasis.     Echo (Done at [facility] a few months ago)  - Shows RV mildly dilated with mildly decreased systolic function.    Assessment/Plan:  Problem List Items Addressed This Visit     ILD (interstitial lung disease) (HCC)     Patient with ILD and progressive pulmonary fibrosis likely 2/2 his connective tissue disease.  He is 66 years old with relatively few other medical problems.  There is concern for chronic aspiration and recurrent pulmonary infections although the data available to not demonstrate growth of bacteria that would prohibit transplant evaluation.  The patient has no contacts or resources available to him in the [facility] or [place], his family is out east or in [place].  He is open to the idea of transplant.   - continue treatment of his RA per rheumatology  - Will discuss patient in pulmonary case conference regarding transplant referral vs. Possible revision of RA therapy  - Continue supplemental O2 as needed  - Order HRCT today  - Follow up pulmonary function tests in December (6 months)  - Continue Tessalon and guaifenesin for cough  - Continue albuterol as needed  - Continue Ofev 150mg BID  - Follow up in 3 months.           Relevant Orders    PULMONARY FUNCTION TESTS -Test requested: Complete Pulmonary Function Test      Other Visit Diagnoses     Interstitial pulmonary disease (HCC)        Relevant Orders    CT-CHEST, HIGH RESOLUTION LUNG    Abnormal results of pulmonary function studies         Relevant Orders    PULMONARY FUNCTION TESTS -Test requested: Complete Pulmonary Function Test            Return in about 3 months (around 1/4/2022).   (return to office  in...)    Time spent in record review prior to patient arrival, reviewing results, and in face-to-face encounter totaled 60 min, excluding any procedures if performed.    Cara Medrano MD RD  Pulmonary and Critical Care

## 2021-10-05 NOTE — ASSESSMENT & PLAN NOTE
Patient with ILD and progressive pulmonary fibrosis likely 2/2 his connective tissue disease.  He is 66 years old with relatively few other medical problems.  There is concern for chronic aspiration and recurrent pulmonary infections although the data available to not demonstrate growth of bacteria that would prohibit transplant evaluation.  The patient has no contacts or resources available to him in the [facility] or [place], his family is out east or in [place].  He is open to the idea of transplant.   - continue treatment of his RA per rheumatology  - Will discuss patient in pulmonary case conference regarding transplant referral vs. Possible revision of RA therapy  - Continue supplemental O2 as needed  - Order HRCT today  - Follow up pulmonary function tests in December (6 months)  - Continue Tessalon and guaifenesin for cough  - Continue albuterol as needed  - Continue Ofev 150mg BID  - Follow up in 3 months.

## 2021-10-10 ENCOUNTER — PATIENT MESSAGE (OUTPATIENT)
Dept: SLEEP MEDICINE | Facility: MEDICAL CENTER | Age: 66
End: 2021-10-10

## 2021-10-11 NOTE — TELEPHONE ENCOUNTER
From: Maximus Bender  To: Physician Cara Medrano  Sent: 10/10/2021 6:29 PM PDT  Subject: Albuterol     I need a prescription for Albuterol for my nebulizer please.

## 2021-10-17 RX ORDER — ALBUTEROL SULFATE 2.5 MG/3ML
2.5 SOLUTION RESPIRATORY (INHALATION) EVERY 4 HOURS PRN
Qty: 60 EACH | Refills: 3 | Status: SHIPPED | OUTPATIENT
Start: 2021-10-17 | End: 2024-01-08

## 2022-02-07 ASSESSMENT — ENCOUNTER SYMPTOMS
WHEEZING: 0
PALPITATIONS: 0
SHORTNESS OF BREATH: 1
HEMOPTYSIS: 0
SPUTUM PRODUCTION: 0
DIZZINESS: 0
HEARTBURN: 1
HEADACHES: 0
COUGH: 1

## 2022-02-08 ENCOUNTER — HOSPITAL ENCOUNTER (OUTPATIENT)
Dept: RADIOLOGY | Facility: MEDICAL CENTER | Age: 67
End: 2022-02-08

## 2022-02-08 ENCOUNTER — OFFICE VISIT (OUTPATIENT)
Dept: SLEEP MEDICINE | Facility: MEDICAL CENTER | Age: 67
End: 2022-02-08
Payer: MEDICARE

## 2022-02-08 VITALS
SYSTOLIC BLOOD PRESSURE: 140 MMHG | WEIGHT: 173 LBS | HEIGHT: 70 IN | BODY MASS INDEX: 24.77 KG/M2 | OXYGEN SATURATION: 93 % | DIASTOLIC BLOOD PRESSURE: 78 MMHG | HEART RATE: 95 BPM

## 2022-02-08 DIAGNOSIS — J84.9 ILD (INTERSTITIAL LUNG DISEASE) (HCC): ICD-10-CM

## 2022-02-08 PROCEDURE — 99215 OFFICE O/P EST HI 40 MIN: CPT | Performed by: INTERNAL MEDICINE

## 2022-02-08 RX ORDER — MYCOPHENOLATE MOFETIL 500 MG/1
500 TABLET ORAL 2 TIMES DAILY
COMMUNITY
Start: 2022-01-19

## 2022-02-08 NOTE — PROGRESS NOTES
Pulmonary Clinic Note    Chief Complaint:  Chief Complaint   Patient presents with   • Follow-Up     ILD. Last seen 10/04/21   • Results     CT-Chest 01/12/22         HPI:   Pulmonary related history:   This patient is a 66 y.o. male whom is followed in our clinic for ILD related to his rheumatoid arthritis.  He is followed by Dr. Perkins from rheumatology and is currently on sulfasalazine and hydroxychloroquine.  He has tried other regimens in the past.  He is followed by ID in [place] for recurrent pneumonias which were previously considered to be secondary to aspiration, he continues on Bactrim and azithromycin chronically for these infections.  On his CT scans he has worsening lower lobe fibrotic lung disease with development of honeycombing on recent CTs.  His last CT chest was in March 2020.  His pulmonary function tests have also declined over the past few years.  He is now on oxygen for some part of almost every day and almost always with exertion.  We talked about turning it down to 1 L instead of completely off as he is going from 0 to 2 L and back again quite frequently during the day.  He is a former 20-pack-year smoker but quit in 1995.    Due to his fibrotic progression he was started on Ofev at 150 mg twice daily.  He was dropped to 100 mg twice daily due to GI intolerance however he has now able to tolerate 150 mg twice a day again with only minimal diarrhea which is tolerable to him and no other significant side effects.  States he has some dependent edema in his ankles from time to time.  He states he recently had an echo at [facility], showed me the results which reveal right ventricular dilation and reduced right ventricular function which is mild.  Pulmonary pressures were unable to be estimated.    He currently manages his cough with Tessalon Perles and guaifenesin, however these medications have decreased efficacy as of late.  He also uses nebulized albuterol when he  has his cough with his oxygen which sometimes helps him get through his cough attacks.  His cough is dry he does not produce sputum.  He does experience postnasal drip and is currently on triple therapy with Zyrtec, Flonase and nasal saline.  He also self medicates on occasion with 10 mg of prednisone which he has at home which he takes sometimes when his cough is really severe.    Today:   Patient is clinically the same. He still requires 2L of oxygen at times but is able to turn it off at rest frequently.  He does not feel that his cough is worse.  He is tolerating the Ofev.  Recently Dr. Perkins changed his medications for his RA and he is now on cellcept.  He has had progression of his ILD on CT imaging although his symptoms remain consistent at this time.  He and his wife are still contemplating transplant.  They will let me know in the coming months which transplant center makes the most sense for them as far as travel is concerned and we will pursue workup at that time.  All their questions were answered today in clinic and concerns addressed regarding this process.      Past Medical History:   Diagnosis Date   • Hyperlipidemia    • Hypertension    • Shortness of breath    • Sleep apnea    • Wears glasses        Social History     Socioeconomic History   • Marital status:      Spouse name: Not on file   • Number of children: Not on file   • Years of education: Not on file   • Highest education level: Not on file   Occupational History   • Not on file   Tobacco Use   • Smoking status: Former Smoker     Packs/day: 0.50     Types: Cigarettes     Start date:      Quit date:      Years since quittin.1   • Smokeless tobacco: Never Used   Vaping Use   • Vaping Use: Never used   Substance and Sexual Activity   • Alcohol use: Not Currently   • Drug use: Yes     Types: Marijuana, Oral     Comment: gummies    • Sexual activity: Not on file   Other Topics Concern   • Not on file   Social History  Narrative   • Not on file     Social Determinants of Health     Financial Resource Strain:    • Difficulty of Paying Living Expenses: Not on file   Food Insecurity:    • Worried About Running Out of Food in the Last Year: Not on file   • Ran Out of Food in the Last Year: Not on file   Transportation Needs:    • Lack of Transportation (Medical): Not on file   • Lack of Transportation (Non-Medical): Not on file   Physical Activity:    • Days of Exercise per Week: Not on file   • Minutes of Exercise per Session: Not on file   Stress:    • Feeling of Stress : Not on file   Social Connections:    • Frequency of Communication with Friends and Family: Not on file   • Frequency of Social Gatherings with Friends and Family: Not on file   • Attends Rastafari Services: Not on file   • Active Member of Clubs or Organizations: Not on file   • Attends Club or Organization Meetings: Not on file   • Marital Status: Not on file   Intimate Partner Violence:    • Fear of Current or Ex-Partner: Not on file   • Emotionally Abused: Not on file   • Physically Abused: Not on file   • Sexually Abused: Not on file   Housing Stability:    • Unable to Pay for Housing in the Last Year: Not on file   • Number of Places Lived in the Last Year: Not on file   • Unstable Housing in the Last Year: Not on file          History reviewed. No pertinent family history.    Current Outpatient Medications on File Prior to Visit   Medication Sig Dispense Refill   • mycophenolate (CELLCEPT) 500 MG tablet Take 500 mg by mouth 2 times a day.     • albuterol (PROVENTIL) 2.5mg/3ml Nebu Soln solution for nebulization Take 3 mL by nebulization every four hours as needed for Shortness of Breath. 60 Each 3   • albuterol (PROVENTIL) 2.5mg/3ml Nebu Soln solution for nebulization Take 3 mL by nebulization every four hours as needed for Shortness of Breath. 60 Each 3   • predniSONE (DELTASONE) 10 MG Tab Take 10 mg by mouth. PRN     • Nintedanib Esylate (OFEV) 150 MG Cap  "Take 150 mg by mouth 2 times a day. 60 capsule 11   • benzonatate (TESSALON) 100 MG Cap TAKE 1 CAPSULE BY MOUTH THREE TIMES DAILY AS NEEDED FOR COUGH 60 capsule 3   • azithromycin (ZITHROMAX) 500 MG tablet Three times a week. One tablet po mon/wed/friday     • amLODIPine (NORVASC) 5 MG Tab Take 10 mg by mouth every day. Take 1 tablet by mouth every day     • ezetimibe (ZETIA) 10 MG Tab TK 1 T PO ONE TIME D     • sulfamethoxazole-trimethoprim (BACTRIM DS) 800-160 MG tablet Take  by mouth. Take 1 tablet by mouth two times a day     • albuterol (PROVENTIL) 2.5mg/3ml Nebu Soln solution for nebulization Take 3 mL by nebulization 2 times a day as needed for Shortness of Breath. 60 Bullet 3   • omeprazole (PRILOSEC) 20 MG delayed-release capsule Take 20 mg by mouth every day.     • albuterol (PROAIR HFA) 108 (90 Base) MCG/ACT Aero Soln inhalation aerosol Inhale 2 Puffs by mouth every 6 hours as needed for Shortness of Breath.     • Probiotic Product (PROBIOTIC DAILY PO) Take 1 Cap by mouth every day. (Patient not taking: Reported on 2/8/2022)     • niacin 500 MG Tab Take 500 mg by mouth every evening. (Patient not taking: Reported on 2/8/2022)       No current facility-administered medications on file prior to visit.       Tape and Other drug      ROS:   Review of Systems   Constitutional: Negative for malaise/fatigue.   Respiratory: Positive for cough and shortness of breath. Negative for hemoptysis, sputum production and wheezing.    Cardiovascular: Negative for chest pain, palpitations and leg swelling.   Gastrointestinal: Positive for heartburn.   Neurological: Negative for dizziness and headaches.       Vitals:  /78 (BP Location: Right arm, Patient Position: Sitting, BP Cuff Size: Adult)   Pulse 95   Ht 1.778 m (5' 10\")   Wt 78.5 kg (173 lb)   SpO2 93%     Physical Exam:  Physical Exam  Constitutional:       Appearance: Normal appearance.   HENT:      Head: Normocephalic and atraumatic.   Cardiovascular:     "  Rate and Rhythm: Normal rate and regular rhythm.      Comments: Loud P2  Pulmonary:      Breath sounds: No wheezing or rhonchi.      Comments: Crackles bilaterally at bases, mild dyspnea with conversation  Chest:      Chest wall: No tenderness.   Abdominal:      General: Abdomen is flat.   Musculoskeletal:         General: Swelling present. No tenderness or deformity.   Skin:     General: Skin is warm and dry.   Neurological:      General: No focal deficit present.      Mental Status: He is alert.         PFTs as reviewed by me personally:  6/9/21:      6MWT 6/9/21: 1080 feet/ 360m, desaturated to 84% and required 2L of oxygen to finish the study. HR 94 -> 101.     Imaging as reviewed by me personally:    3/29/2020:        CT with honeycombing developing at the bases with traction bronchiectasis.     7/22/2020:    CT with mildly worsening honeycombing at the bases and traction bronchiectasis.     CT 2/8/22:      Worsening upper lobe involvement of honeycombing and increased bronchiectasis bilaterally.      Echo (Done at [facility] a few months ago)  - Shows RV mildly dilated with mildly decreased systolic function.    Assessment/Plan:  Problem List Items Addressed This Visit     ILD (interstitial lung disease) (HCC)     Progression of disease on CT.  Remains on 2 L of oxygen intermittently.  Symptoms largely unchanged.  Now on cellcept for RA.  Plan:  - Obtain Echo records  - repeat PFT prior to June  - Follow up in June  - Patient and his wife to select a location for transplant workup and referral and let me know.                  Return in about 4 months (around 6/8/2022).   (return to office in...)    Time spent in record review prior to patient arrival, reviewing results, and in face-to-face encounter totaled 60 min, excluding any procedures if performed.    Cara Medrano MD RD  Pulmonary and Critical Care

## 2022-02-09 NOTE — ASSESSMENT & PLAN NOTE
Progression of disease on CT.  Remains on 2 L of oxygen intermittently.  Symptoms largely unchanged.  Now on cellcept for RA.  Plan:  - Obtain Echo records  - repeat PFT prior to June  - Follow up in June  - Patient and his wife to select a location for transplant workup and referral and let me know.

## 2022-06-24 DIAGNOSIS — J84.9 ILD (INTERSTITIAL LUNG DISEASE) (HCC): ICD-10-CM

## 2022-06-27 RX ORDER — NINTEDANIB 150 MG/1
CAPSULE ORAL
Qty: 60 CAPSULE | Refills: 11 | Status: SHIPPED | OUTPATIENT
Start: 2022-06-27 | End: 2024-01-08

## 2022-06-27 NOTE — TELEPHONE ENCOUNTER
Caller Name: Maximus Bender                 Call Back Number: [phone] (home) [phone] (work)        Patient approves a detailed voicemail message: N\A    Have we ever prescribed this med? Yes.  If yes, what date? 7/26/21     Last OV: 2/8/22 Dr. Medrano   Return in about 4 months (around 6/8/2022  Next OV: none     DX: ILD (interstitial lung disease    Medications:  Current Outpatient Medications   Medication Sig Dispense Refill   • mycophenolate (CELLCEPT) 500 MG tablet Take 500 mg by mouth 2 times a day.     • albuterol (PROVENTIL) 2.5mg/3ml Nebu Soln solution for nebulization Take 3 mL by nebulization every four hours as needed for Shortness of Breath. 60 Each 3   • albuterol (PROVENTIL) 2.5mg/3ml Nebu Soln solution for nebulization Take 3 mL by nebulization every four hours as needed for Shortness of Breath. 60 Each 3   • predniSONE (DELTASONE) 10 MG Tab Take 10 mg by mouth. PRN     • Nintedanib Esylate (OFEV) 150 MG Cap Take 150 mg by mouth 2 times a day. 60 capsule 11   • benzonatate (TESSALON) 100 MG Cap TAKE 1 CAPSULE BY MOUTH THREE TIMES DAILY AS NEEDED FOR COUGH 60 capsule 3   • azithromycin (ZITHROMAX) 500 MG tablet Three times a week. One tablet po mon/wed/friday     • amLODIPine (NORVASC) 5 MG Tab Take 10 mg by mouth every day. Take 1 tablet by mouth every day     • ezetimibe (ZETIA) 10 MG Tab TK 1 T PO ONE TIME D     • sulfamethoxazole-trimethoprim (BACTRIM DS) 800-160 MG tablet Take  by mouth. Take 1 tablet by mouth two times a day     • albuterol (PROVENTIL) 2.5mg/3ml Nebu Soln solution for nebulization Take 3 mL by nebulization 2 times a day as needed for Shortness of Breath. 60 Bullet 3   • omeprazole (PRILOSEC) 20 MG delayed-release capsule Take 20 mg by mouth every day.     • albuterol (PROAIR HFA) 108 (90 Base) MCG/ACT Aero Soln inhalation aerosol Inhale 2 Puffs by mouth every 6 hours as needed for Shortness of Breath.       No current facility-administered medications for this  visit.

## 2022-06-28 NOTE — TELEPHONE ENCOUNTER
LEXY Jean Baptiste.  You 21 hours ago (10:02 AM)         Patient is due for a 4 month f/u with either Dr. Medrano or Dr. Almaguer. Last saw Dr. Medrano.    Routing comment      Left vm notified over due visit. Advised to call scheduling [phone]

## 2022-07-03 DIAGNOSIS — J84.9 ILD (INTERSTITIAL LUNG DISEASE) (HCC): ICD-10-CM

## 2022-07-06 RX ORDER — ALBUTEROL SULFATE 2.5 MG/3ML
SOLUTION RESPIRATORY (INHALATION)
Qty: 180 ML | Refills: 0 | Status: ON HOLD | OUTPATIENT
Start: 2022-07-06 | End: 2023-12-18

## 2022-07-06 NOTE — TELEPHONE ENCOUNTER
Have we ever prescribed this med? Yes.  If yes, what date? 10//17/2021    Last OV: 2/8/2022 - Dr. Medrano     Next OV: NONE    DX: ILD (interstitial lung disease) (HCC) (J84.9)    Medications: albuterol (PROVENTIL) 2.5mg/3ml Nebu Soln solution for nebulization

## 2022-07-07 NOTE — TELEPHONE ENCOUNTER
LVM for the pt informing him that we received a refill request for albuterol (PROVENTIL) 2.5mg/3ml Nebu Soln solution for nebulization and it was approve and sent to the [id number] - [place], [place] - [address] [facility] [address]

## 2022-09-26 ENCOUNTER — TELEPHONE (OUTPATIENT)
Dept: SLEEP MEDICINE | Facility: MEDICAL CENTER | Age: 67
End: 2022-09-26
Payer: MEDICARE

## 2022-09-26 NOTE — TELEPHONE ENCOUNTER
MEDICATION PRIOR AUTHORIZATION NEEDED:    1. Name of Medication: Ofev    2. Requested By (Name of Pharmacy): [facility]     3. Is insurance on file current? yes    4. What is the name & phone number of the 3rd party payor? /[employer]

## 2022-09-29 NOTE — TELEPHONE ENCOUNTER
FINAL PRIOR AUTHORIZATION STATUS:    1.  Name of Medication & Dose: OFEV     2. Prior Auth Status: Approved through 9/26/2023     3. Action Taken: Pharmacy Notified: yes Patient Notified: N\A      Scanned in media.

## 2023-10-11 ENCOUNTER — TELEPHONE (OUTPATIENT)
Dept: CARDIOLOGY | Facility: MEDICAL CENTER | Age: 68
End: 2023-10-11
Payer: MEDICARE

## 2023-10-11 DIAGNOSIS — I48.20 CHRONIC ATRIAL FIBRILLATION (HCC): ICD-10-CM

## 2023-10-11 NOTE — TELEPHONE ENCOUNTER
Dr. Lee,    I recvd a call from this patient's wife. [facility]. You discussed an ablation with him at that time. What type of ablation would you like this patient scheduled for? Are there any medications you would like this patient to hold for this procedure?    [facility]    Thank You,  Melanie

## 2023-10-12 NOTE — TELEPHONE ENCOUNTER
Winnie,    Can you please enter the orders for this procedure?    Afib ablation w/MARINA    Thank You,  Melanie

## 2023-10-12 NOTE — TELEPHONE ENCOUNTER
Ajay Lee M.D.  Melanie Logan, Med Ass't  Caller: Unspecified (Yesterday,  4:40 PM)  Still don't see my note, but alo bull says it's Afib ablation that we need to do. No meds to hold.

## 2023-10-13 NOTE — TELEPHONE ENCOUNTER
Called patient to schedule - his wife is at a  right now and she has the calendar. I will call her back on Monday to schedule, if I do not hear from her today.

## 2023-10-16 NOTE — TELEPHONE ENCOUNTER
Patient scheduled for afib ablation w/MARINA on 12-12-23 with Dr. Lee. Patient has been instructed to check in at 11:00 for 1:00 case time. Message sent to authorizations. Emailed Carto.

## 2023-10-19 ENCOUNTER — APPOINTMENT (OUTPATIENT)
Dept: ADMISSIONS | Facility: MEDICAL CENTER | Age: 68
End: 2023-10-19
Attending: INTERNAL MEDICINE
Payer: MEDICARE

## 2023-10-19 ENCOUNTER — HOSPITAL ENCOUNTER (OUTPATIENT)
Facility: MEDICAL CENTER | Age: 68
End: 2023-10-19
Attending: INTERNAL MEDICINE | Admitting: INTERNAL MEDICINE
Payer: MEDICARE

## 2023-11-16 ENCOUNTER — APPOINTMENT (OUTPATIENT)
Dept: ADMISSIONS | Facility: MEDICAL CENTER | Age: 68
End: 2023-11-16
Attending: INTERNAL MEDICINE
Payer: MEDICARE

## 2023-11-16 NOTE — TELEPHONE ENCOUNTER
Recvd call from Nara (patient's wife) this patient has to go to [place]. This is were he had his lung transplant at. They are concerned with the increased amount of hospitalizations Maximus has had recently for his colon problem. I did ask Nara to keep us updated with what happens in [place].     Dr. Lee - given this concern. Am I ok to reschedule this procedure or would you prefer to see Maximus before rescheduling?    Cancelled case with Bhupendra in cath lab scheduling and emailed Carto

## 2023-11-17 NOTE — TELEPHONE ENCOUNTER
Called and spoke with Nara - she will call and keep us updated with the plan in [place]. We will schedule an appointment once they are able to get a plan of care.

## 2023-11-17 NOTE — TELEPHONE ENCOUNTER
Ajay Lee M.D.  Melanie Logan, Med Ass't  Caller: Unspecified (1 month ago)  Probably see in office before reschedule

## 2023-12-01 NOTE — TELEPHONE ENCOUNTER
Patients wife called and patient is scheduled to see  on 12-11-23 at 3:00 and spot held for afib ablation on 12-18-23 if patient needs ablation.

## 2023-12-11 ENCOUNTER — OFFICE VISIT (OUTPATIENT)
Dept: CARDIOLOGY | Facility: MEDICAL CENTER | Age: 68
End: 2023-12-11
Attending: INTERNAL MEDICINE
Payer: MEDICARE

## 2023-12-11 VITALS
HEIGHT: 68 IN | OXYGEN SATURATION: 98 % | RESPIRATION RATE: 12 BRPM | HEART RATE: 128 BPM | SYSTOLIC BLOOD PRESSURE: 122 MMHG | BODY MASS INDEX: 19.4 KG/M2 | WEIGHT: 128 LBS | DIASTOLIC BLOOD PRESSURE: 68 MMHG

## 2023-12-11 DIAGNOSIS — I10 HTN (HYPERTENSION), BENIGN: ICD-10-CM

## 2023-12-11 DIAGNOSIS — I48.0 PAROXYSMAL ATRIAL FIBRILLATION (HCC): ICD-10-CM

## 2023-12-11 DIAGNOSIS — I48.4 ATYPICAL ATRIAL FLUTTER (HCC): ICD-10-CM

## 2023-12-11 PROCEDURE — 3074F SYST BP LT 130 MM HG: CPT | Performed by: INTERNAL MEDICINE

## 2023-12-11 PROCEDURE — 3078F DIAST BP <80 MM HG: CPT | Performed by: INTERNAL MEDICINE

## 2023-12-11 PROCEDURE — 99213 OFFICE O/P EST LOW 20 MIN: CPT | Performed by: INTERNAL MEDICINE

## 2023-12-11 PROCEDURE — 99214 OFFICE O/P EST MOD 30 MIN: CPT | Performed by: INTERNAL MEDICINE

## 2023-12-11 PROCEDURE — 93005 ELECTROCARDIOGRAM TRACING: CPT | Performed by: INTERNAL MEDICINE

## 2023-12-11 RX ORDER — SPIRONOLACTONE 25 MG/1
25 TABLET ORAL DAILY
COMMUNITY
End: 2024-01-08

## 2023-12-11 RX ORDER — SACUBITRIL AND VALSARTAN 24; 26 MG/1; MG/1
1 TABLET, FILM COATED ORAL 2 TIMES DAILY
COMMUNITY
End: 2024-02-15

## 2023-12-11 RX ORDER — ROSUVASTATIN CALCIUM 20 MG/1
20 TABLET, COATED ORAL EVERY EVENING
COMMUNITY

## 2023-12-11 RX ORDER — TACROLIMUS 0.5 MG/1
2.5 CAPSULE ORAL
COMMUNITY
Start: 2023-08-11

## 2023-12-11 RX ORDER — ONDANSETRON 8 MG/1
8 TABLET, ORALLY DISINTEGRATING ORAL EVERY 8 HOURS PRN
COMMUNITY

## 2023-12-11 RX ORDER — CALCIUM CARBONATE 500 MG/1
1000 TABLET, CHEWABLE ORAL 2 TIMES DAILY
COMMUNITY

## 2023-12-11 RX ORDER — VITAMIN B COMPLEX
1000 TABLET ORAL DAILY
COMMUNITY

## 2023-12-11 ASSESSMENT — FIBROSIS 4 INDEX: FIB4 SCORE: 1.27

## 2023-12-11 NOTE — PROGRESS NOTES
Arrhythmia Clinic Note (Established patient)    DOS: 2023    Chief complaint/Reason for consult: F/u PAF    Interval History:  Pt is a 67 yo M. H/o of lung transplantation. [facility] He has PAF and was scheduled for AF ablation but had to cancel due to ongoing issues with diverticulitis. It sounds like the colitis issues for now resolved. Just came back from [facility] about 5 days ago. Feeling okay. In 2:1 flutter today on 12 lead.    ROS (+ highlighted in red):  General--Negative for fatigue, weight loss or weight gain  Cardiovascular--Negative for CP, orthopnea, PND    Past Medical History:   Diagnosis Date    Hyperlipidemia     Hypertension     Shortness of breath     Sleep apnea     Wears glasses        Past Surgical History:   Procedure Laterality Date    FUSION, SPINE, LUMBAR, PLIF  2016    Procedure: LUMBAR FUSION POSTERIOR  L2-4 ;  Surgeon: Olaf Rich M.D.;  Location: SURGERY [facility];  Service:        Social History     Socioeconomic History    Marital status:      Spouse name: Not on file    Number of children: Not on file    Years of education: Not on file    Highest education level: Not on file   Occupational History    Not on file   Tobacco Use    Smoking status: Former     Current packs/day: 0.00     Average packs/day: 0.5 packs/day for 26.0 years (13.0 ttl pk-yrs)     Types: Cigarettes     Start date:      Quit date:      Years since quittin.9    Smokeless tobacco: Never   Vaping Use    Vaping Use: Never used   Substance and Sexual Activity    Alcohol use: Not Currently    Drug use: Yes     Types: Marijuana, Oral     Comment: gummies     Sexual activity: Not on file   Other Topics Concern    Not on file   Social History Narrative    Not on file     Social Determinants of Health     Financial Resource Strain: Not on file   Food Insecurity: Not on file   Transportation Needs: Not on file   Physical Activity: Not on file   Stress: Not  on file   Social Connections: Not on file   Intimate Partner Violence: Not on file   Housing Stability: Not on file       History reviewed. No pertinent family history.    Allergies   Allergen Reactions    Tape      RASH Severe    Other Drug      Other reaction(s): .Unknown  surgical tape       Current Outpatient Medications   Medication Sig Dispense Refill    tacrolimus (PROGRAF) 0.5 MG Cap Take 1.5 mg by mouth.      rivaroxaban (XARELTO) 20 MG Tab tablet Take 20 mg by mouth with dinner.      Empagliflozin 25 MG Tab Take  by mouth.      Metoprolol Succinate 50 MG Capsule ER 24 Hour Sprinkle Take  by mouth.      rosuvastatin (CRESTOR) 20 MG Tab Take 40 mg by mouth every evening.      sacubitril-valsartan (ENTRESTO) 24-26 MG Tab Take 1 Tablet by mouth 2 times a day.      spironolactone (ALDACTONE) 25 MG Tab Take 25 mg by mouth every day.      calcium carbonate (TUMS) 500 MG Chew Tab Chew 500 mg every day.      vitamin D3 (CHOLECALCIFEROL) 1000 Unit (25 mcg) Tab Take 1,000 Units by mouth every day.      multivitamin Tab Take 1 Tablet by mouth every day.      ondansetron (ZOFRAN ODT) 8 MG TABLET DISPERSIBLE Take 8 mg by mouth every 8 hours as needed for Nausea.      PANTOPRAZOLE SODIUM PO Take  by mouth.      Ferrous Sulfate (IRON PO) Take  by mouth.      mycophenolate (CELLCEPT) 500 MG tablet Take 500 mg by mouth every day.      predniSONE (DELTASONE) 10 MG Tab Take 10 mg by mouth. PRN      ezetimibe (ZETIA) 10 MG Tab TK 1 T PO ONE TIME D      albuterol (PROVENTIL) 2.5mg/3ml Nebu Soln solution for nebulization USE 1 VIAL VIA NEBULIZER EVERY 4 HOURS AS NEEDED FOR SHORTNESS OF BREATH (Patient not taking: Reported on 12/11/2023) 180 mL 0    OFEV 150 MG Cap Take 1 capsule twice daily. (Patient not taking: Reported on 12/11/2023) 60 Capsule 11    albuterol (PROVENTIL) 2.5mg/3ml Nebu Soln solution for nebulization Take 3 mL by nebulization every four hours as needed for Shortness of Breath. (Patient not taking: Reported on  "12/11/2023) 60 Each 3    benzonatate (TESSALON) 100 MG Cap TAKE 1 CAPSULE BY MOUTH THREE TIMES DAILY AS NEEDED FOR COUGH (Patient not taking: Reported on 12/11/2023) 60 capsule 3    azithromycin (ZITHROMAX) 500 MG tablet Three times a week. One tablet po mon/wed/friday (Patient not taking: Reported on 12/11/2023)      amLODIPine (NORVASC) 5 MG Tab Take 10 mg by mouth every day. Take 1 tablet by mouth every day (Patient not taking: Reported on 12/11/2023)      sulfamethoxazole-trimethoprim (BACTRIM DS) 800-160 MG tablet Take  by mouth. Take 1 tablet by mouth two times a day (Patient not taking: Reported on 12/11/2023)      albuterol (PROVENTIL) 2.5mg/3ml Nebu Soln solution for nebulization Take 3 mL by nebulization 2 times a day as needed for Shortness of Breath. (Patient not taking: Reported on 12/11/2023) 60 Bullet 3    omeprazole (PRILOSEC) 20 MG delayed-release capsule Take 20 mg by mouth every day. (Patient not taking: Reported on 12/11/2023)      albuterol (PROAIR HFA) 108 (90 Base) MCG/ACT Aero Soln inhalation aerosol Inhale 2 Puffs by mouth every 6 hours as needed for Shortness of Breath. (Patient not taking: Reported on 12/11/2023)       No current facility-administered medications for this visit.       Physical Exam:  Vitals:    12/11/23 1451   BP: 122/68   BP Location: Left arm   Patient Position: Sitting   BP Cuff Size: Adult   Pulse: (!) 128   Resp: 12   SpO2: 98%   Weight: 58.1 kg (128 lb)   Height: 1.727 m (5' 8\")     General appearance: NAD, conversant  HEENT: PERRL, neck is supple with FROM  Lungs: Clear to auscultation, normal respiratory effort  CV: RRR, no murmurs/rubs/gallops, no JVD  Abdomen: Soft, non-tender with normal bowel sounds  Extremities: No peripheral edema, no clubbing or cyanosis  Skin: No rash, lesions, or ulcers  Psych: Alert and oriented to person, place and time    Data:  Labs reviewed    EKG interpreted by me:  2:1 flutter    Impression/Plan:  1. Paroxysmal atrial fibrillation " (HCC)  EKG      2. HTN (hypertension), benign        3. Atypical atrial flutter (HCC)        -Clearly atrial arrhythmia still persistent issue  -2:1 flutter today  -Atypical appearing  -Time reserved for next week in EP lab for ablation  -Risks/benefits discussed, I think he is stable from colitis standpoint, will proceed with ablation    Ajay Lee MD

## 2023-12-12 ENCOUNTER — APPOINTMENT (OUTPATIENT)
Dept: CARDIOLOGY | Facility: MEDICAL CENTER | Age: 68
End: 2023-12-12
Attending: INTERNAL MEDICINE
Payer: MEDICARE

## 2023-12-12 ENCOUNTER — TELEPHONE (OUTPATIENT)
Dept: CARDIOLOGY | Facility: MEDICAL CENTER | Age: 68
End: 2023-12-12
Payer: MEDICARE

## 2023-12-12 NOTE — TELEPHONE ENCOUNTER
Patient scheduled for afib ablation w/MARINA on 12-18-23 with Dr. Lee. Patient has been instructed to check in at 6:00 for 7:30 case time. Message sent to authorizations. Emailed Carto

## 2023-12-12 NOTE — TELEPHONE ENCOUNTER
----- Message from Ajay Lee M.D. sent at 12/11/2023  3:26 PM PST -----  Let's put him back on schedule for fib ablation.No meds to hold.

## 2023-12-12 NOTE — TELEPHONE ENCOUNTER
Called patient to schedule. He is still in bed and would like me to call him after lunch today to schedule.

## 2023-12-14 ENCOUNTER — APPOINTMENT (OUTPATIENT)
Dept: ADMISSIONS | Facility: MEDICAL CENTER | Age: 68
End: 2023-12-14
Attending: INTERNAL MEDICINE
Payer: MEDICARE

## 2023-12-18 ENCOUNTER — APPOINTMENT (OUTPATIENT)
Dept: CARDIOLOGY | Facility: MEDICAL CENTER | Age: 68
End: 2023-12-18
Attending: INTERNAL MEDICINE
Payer: MEDICARE

## 2023-12-18 ENCOUNTER — ANESTHESIA EVENT (OUTPATIENT)
Dept: CARDIOLOGY | Facility: MEDICAL CENTER | Age: 68
End: 2023-12-18
Payer: MEDICARE

## 2023-12-18 ENCOUNTER — ANESTHESIA (OUTPATIENT)
Dept: CARDIOLOGY | Facility: MEDICAL CENTER | Age: 68
End: 2023-12-18
Payer: MEDICARE

## 2023-12-18 ENCOUNTER — HOSPITAL ENCOUNTER (OUTPATIENT)
Facility: MEDICAL CENTER | Age: 68
End: 2023-12-18
Attending: INTERNAL MEDICINE | Admitting: INTERNAL MEDICINE
Payer: MEDICARE

## 2023-12-18 VITALS
HEART RATE: 82 BPM | SYSTOLIC BLOOD PRESSURE: 90 MMHG | BODY MASS INDEX: 19.91 KG/M2 | OXYGEN SATURATION: 98 % | TEMPERATURE: 98.3 F | HEIGHT: 68 IN | DIASTOLIC BLOOD PRESSURE: 57 MMHG | RESPIRATION RATE: 20 BRPM | WEIGHT: 131.39 LBS

## 2023-12-18 DIAGNOSIS — Z98.890 H/O CARDIAC RADIOFREQUENCY ABLATION: ICD-10-CM

## 2023-12-18 DIAGNOSIS — I48.20 CHRONIC ATRIAL FIBRILLATION (HCC): ICD-10-CM

## 2023-12-18 LAB
ACT BLD: 282 SEC (ref 74–137)
ACT BLD: 287 SEC (ref 74–137)
ALBUMIN SERPL BCP-MCNC: 4 G/DL (ref 3.2–4.9)
ALBUMIN/GLOB SERPL: 1.4 G/DL
ALP SERPL-CCNC: 65 U/L (ref 30–99)
ALT SERPL-CCNC: 14 U/L (ref 2–50)
ANION GAP SERPL CALC-SCNC: 13 MMOL/L (ref 7–16)
AST SERPL-CCNC: 13 U/L (ref 12–45)
BILIRUB SERPL-MCNC: 0.4 MG/DL (ref 0.1–1.5)
BUN SERPL-MCNC: 32 MG/DL (ref 8–22)
CALCIUM ALBUM COR SERPL-MCNC: 8.8 MG/DL (ref 8.5–10.5)
CALCIUM SERPL-MCNC: 8.8 MG/DL (ref 8.5–10.5)
CHLORIDE SERPL-SCNC: 105 MMOL/L (ref 96–112)
CO2 SERPL-SCNC: 23 MMOL/L (ref 20–33)
CREAT SERPL-MCNC: 1.12 MG/DL (ref 0.5–1.4)
EKG IMPRESSION: NORMAL
EKG IMPRESSION: NORMAL
ERYTHROCYTE [DISTWIDTH] IN BLOOD BY AUTOMATED COUNT: 48.4 FL (ref 35.9–50)
GFR SERPLBLD CREATININE-BSD FMLA CKD-EPI: 71 ML/MIN/1.73 M 2
GLOBULIN SER CALC-MCNC: 2.8 G/DL (ref 1.9–3.5)
GLUCOSE SERPL-MCNC: 104 MG/DL (ref 65–99)
HCT VFR BLD AUTO: 37.6 % (ref 42–52)
HGB BLD-MCNC: 11.6 G/DL (ref 14–18)
INR PPP: 1.93 (ref 0.87–1.13)
MCH RBC QN AUTO: 25.2 PG (ref 27–33)
MCHC RBC AUTO-ENTMCNC: 30.9 G/DL (ref 32.3–36.5)
MCV RBC AUTO: 81.7 FL (ref 81.4–97.8)
PLATELET # BLD AUTO: 252 K/UL (ref 164–446)
PMV BLD AUTO: 9.7 FL (ref 9–12.9)
POTASSIUM SERPL-SCNC: 4.1 MMOL/L (ref 3.6–5.5)
PROT SERPL-MCNC: 6.8 G/DL (ref 6–8.2)
PROTHROMBIN TIME: 22.3 SEC (ref 12–14.6)
RBC # BLD AUTO: 4.6 M/UL (ref 4.7–6.1)
SODIUM SERPL-SCNC: 141 MMOL/L (ref 135–145)
WBC # BLD AUTO: 8.4 K/UL (ref 4.8–10.8)

## 2023-12-18 PROCEDURE — 80053 COMPREHEN METABOLIC PANEL: CPT

## 2023-12-18 PROCEDURE — 93005 ELECTROCARDIOGRAM TRACING: CPT | Performed by: INTERNAL MEDICINE

## 2023-12-18 PROCEDURE — 700111 HCHG RX REV CODE 636 W/ 250 OVERRIDE (IP)

## 2023-12-18 PROCEDURE — 160036 HCHG PACU - EA ADDL 30 MINS PHASE I

## 2023-12-18 PROCEDURE — 700101 HCHG RX REV CODE 250

## 2023-12-18 PROCEDURE — 700105 HCHG RX REV CODE 258: Performed by: INTERNAL MEDICINE

## 2023-12-18 PROCEDURE — 700105 HCHG RX REV CODE 258: Performed by: STUDENT IN AN ORGANIZED HEALTH CARE EDUCATION/TRAINING PROGRAM

## 2023-12-18 PROCEDURE — 85610 PROTHROMBIN TIME: CPT

## 2023-12-18 PROCEDURE — 93623 PRGRMD STIMJ&PACG IV RX NFS: CPT | Mod: 26 | Performed by: INTERNAL MEDICINE

## 2023-12-18 PROCEDURE — 85027 COMPLETE CBC AUTOMATED: CPT

## 2023-12-18 PROCEDURE — 93325 DOPPLER ECHO COLOR FLOW MAPG: CPT

## 2023-12-18 PROCEDURE — 93623 PRGRMD STIMJ&PACG IV RX NFS: CPT

## 2023-12-18 PROCEDURE — 160035 HCHG PACU - 1ST 60 MINS PHASE I

## 2023-12-18 PROCEDURE — 93656 COMPRE EP EVAL ABLTJ ATR FIB: CPT | Performed by: INTERNAL MEDICINE

## 2023-12-18 PROCEDURE — 93010 ELECTROCARDIOGRAM REPORT: CPT | Mod: 76 | Performed by: STUDENT IN AN ORGANIZED HEALTH CARE EDUCATION/TRAINING PROGRAM

## 2023-12-18 PROCEDURE — A9270 NON-COVERED ITEM OR SERVICE: HCPCS | Performed by: INTERNAL MEDICINE

## 2023-12-18 PROCEDURE — 160002 HCHG RECOVERY MINUTES (STAT)

## 2023-12-18 PROCEDURE — 700111 HCHG RX REV CODE 636 W/ 250 OVERRIDE (IP): Performed by: STUDENT IN AN ORGANIZED HEALTH CARE EDUCATION/TRAINING PROGRAM

## 2023-12-18 PROCEDURE — 85347 COAGULATION TIME ACTIVATED: CPT

## 2023-12-18 PROCEDURE — 700111 HCHG RX REV CODE 636 W/ 250 OVERRIDE (IP): Mod: JZ | Performed by: INTERNAL MEDICINE

## 2023-12-18 PROCEDURE — 700102 HCHG RX REV CODE 250 W/ 637 OVERRIDE(OP): Performed by: INTERNAL MEDICINE

## 2023-12-18 PROCEDURE — 93655 ICAR CATH ABLTJ DSCRT ARRHYT: CPT | Performed by: INTERNAL MEDICINE

## 2023-12-18 PROCEDURE — 700101 HCHG RX REV CODE 250: Performed by: STUDENT IN AN ORGANIZED HEALTH CARE EDUCATION/TRAINING PROGRAM

## 2023-12-18 PROCEDURE — 93312 ECHO TRANSESOPHAGEAL: CPT | Mod: 26 | Performed by: INTERNAL MEDICINE

## 2023-12-18 RX ORDER — BUPIVACAINE HYDROCHLORIDE 5 MG/ML
INJECTION, SOLUTION EPIDURAL; INTRACAUDAL
Status: COMPLETED
Start: 2023-12-18 | End: 2023-12-18

## 2023-12-18 RX ORDER — SODIUM CHLORIDE, SODIUM LACTATE, POTASSIUM CHLORIDE, CALCIUM CHLORIDE 600; 310; 30; 20 MG/100ML; MG/100ML; MG/100ML; MG/100ML
INJECTION, SOLUTION INTRAVENOUS CONTINUOUS
Status: ACTIVE | OUTPATIENT
Start: 2023-12-18 | End: 2023-12-18

## 2023-12-18 RX ORDER — LIDOCAINE HYDROCHLORIDE 20 MG/ML
INJECTION, SOLUTION EPIDURAL; INFILTRATION; INTRACAUDAL; PERINEURAL PRN
Status: DISCONTINUED | OUTPATIENT
Start: 2023-12-18 | End: 2023-12-18 | Stop reason: SURG

## 2023-12-18 RX ORDER — ONDANSETRON 2 MG/ML
4 INJECTION INTRAMUSCULAR; INTRAVENOUS EVERY 6 HOURS PRN
Status: DISCONTINUED | OUTPATIENT
Start: 2023-12-18 | End: 2023-12-18 | Stop reason: HOSPADM

## 2023-12-18 RX ORDER — ONDANSETRON 2 MG/ML
INJECTION INTRAMUSCULAR; INTRAVENOUS PRN
Status: DISCONTINUED | OUTPATIENT
Start: 2023-12-18 | End: 2023-12-18 | Stop reason: SURG

## 2023-12-18 RX ORDER — HEPARIN SODIUM 1000 [USP'U]/ML
INJECTION, SOLUTION INTRAVENOUS; SUBCUTANEOUS
Status: COMPLETED
Start: 2023-12-18 | End: 2023-12-18

## 2023-12-18 RX ORDER — HEPARIN SODIUM 200 [USP'U]/100ML
INJECTION, SOLUTION INTRAVENOUS
Status: COMPLETED
Start: 2023-12-18 | End: 2023-12-18

## 2023-12-18 RX ORDER — SUCRALFATE 1 G/1
1 TABLET ORAL
Qty: 56 TABLET | Refills: 0 | Status: SHIPPED | OUTPATIENT
Start: 2023-12-18 | End: 2024-01-01

## 2023-12-18 RX ORDER — DEXAMETHASONE SODIUM PHOSPHATE 4 MG/ML
INJECTION, SOLUTION INTRA-ARTICULAR; INTRALESIONAL; INTRAMUSCULAR; INTRAVENOUS; SOFT TISSUE PRN
Status: DISCONTINUED | OUTPATIENT
Start: 2023-12-18 | End: 2023-12-18 | Stop reason: SURG

## 2023-12-18 RX ORDER — PHENYLEPHRINE HYDROCHLORIDE 10 MG/ML
INJECTION, SOLUTION INTRAMUSCULAR; INTRAVENOUS; SUBCUTANEOUS PRN
Status: DISCONTINUED | OUTPATIENT
Start: 2023-12-18 | End: 2023-12-18 | Stop reason: SURG

## 2023-12-18 RX ORDER — PROTAMINE SULFATE 10 MG/ML
INJECTION, SOLUTION INTRAVENOUS PRN
Status: DISCONTINUED | OUTPATIENT
Start: 2023-12-18 | End: 2023-12-18 | Stop reason: SURG

## 2023-12-18 RX ORDER — OMEPRAZOLE 20 MG/1
20 CAPSULE, DELAYED RELEASE ORAL DAILY
Qty: 30 CAPSULE | Refills: 0 | Status: SHIPPED | OUTPATIENT
Start: 2023-12-18 | End: 2024-01-08

## 2023-12-18 RX ORDER — LIDOCAINE HYDROCHLORIDE 20 MG/ML
INJECTION, SOLUTION INFILTRATION; PERINEURAL
Status: COMPLETED
Start: 2023-12-18 | End: 2023-12-18

## 2023-12-18 RX ORDER — ACETAMINOPHEN 325 MG/1
650 TABLET ORAL EVERY 4 HOURS PRN
Status: DISCONTINUED | OUTPATIENT
Start: 2023-12-18 | End: 2023-12-18 | Stop reason: HOSPADM

## 2023-12-18 RX ORDER — PROTAMINE SULFATE 10 MG/ML
INJECTION, SOLUTION INTRAVENOUS
Status: COMPLETED
Start: 2023-12-18 | End: 2023-12-18

## 2023-12-18 RX ORDER — MIDAZOLAM HYDROCHLORIDE 1 MG/ML
INJECTION INTRAMUSCULAR; INTRAVENOUS
Status: COMPLETED
Start: 2023-12-18 | End: 2023-12-18

## 2023-12-18 RX ORDER — ISOPROTERENOL HYDROCHLORIDE 0.2 MG/ML
INJECTION, SOLUTION INTRAVENOUS
Status: COMPLETED
Start: 2023-12-18 | End: 2023-12-18

## 2023-12-18 RX ADMIN — PHENYLEPHRINE HYDROCHLORIDE 80 MCG: 10 INJECTION INTRAVENOUS at 09:29

## 2023-12-18 RX ADMIN — ROCURONIUM BROMIDE 50 MG: 10 INJECTION, SOLUTION INTRAVENOUS at 07:58

## 2023-12-18 RX ADMIN — ACETAMINOPHEN 650 MG: 325 TABLET, FILM COATED ORAL at 10:39

## 2023-12-18 RX ADMIN — FENTANYL CITRATE 50 MCG: 50 INJECTION, SOLUTION INTRAMUSCULAR; INTRAVENOUS at 10:04

## 2023-12-18 RX ADMIN — DEXAMETHASONE SODIUM PHOSPHATE 4 MG: 4 INJECTION INTRA-ARTICULAR; INTRALESIONAL; INTRAMUSCULAR; INTRAVENOUS; SOFT TISSUE at 08:08

## 2023-12-18 RX ADMIN — HEPARIN SODIUM 2000 UNITS: 1000 INJECTION, SOLUTION INTRAVENOUS; SUBCUTANEOUS at 08:57

## 2023-12-18 RX ADMIN — ONDANSETRON 4 MG: 2 INJECTION INTRAMUSCULAR; INTRAVENOUS at 10:24

## 2023-12-18 RX ADMIN — PHENYLEPHRINE HYDROCHLORIDE 80 MCG: 10 INJECTION INTRAVENOUS at 09:05

## 2023-12-18 RX ADMIN — SUGAMMADEX 200 MG: 100 INJECTION, SOLUTION INTRAVENOUS at 09:54

## 2023-12-18 RX ADMIN — LIDOCAINE HYDROCHLORIDE 60 MG: 20 INJECTION, SOLUTION EPIDURAL; INFILTRATION; INTRACAUDAL at 07:58

## 2023-12-18 RX ADMIN — LIDOCAINE HYDROCHLORIDE: 20 INJECTION, SOLUTION INFILTRATION; PERINEURAL at 07:57

## 2023-12-18 RX ADMIN — MIDAZOLAM HYDROCHLORIDE 2 MG: 2 INJECTION, SOLUTION INTRAMUSCULAR; INTRAVENOUS at 07:56

## 2023-12-18 RX ADMIN — PROTAMINE SULFATE 40 MG: 10 INJECTION, SOLUTION INTRAVENOUS at 09:43

## 2023-12-18 RX ADMIN — ROCURONIUM BROMIDE 10 MG: 10 INJECTION, SOLUTION INTRAVENOUS at 09:19

## 2023-12-18 RX ADMIN — BUPIVACAINE HYDROCHLORIDE: 5 INJECTION, SOLUTION EPIDURAL; INTRACAUDAL at 07:57

## 2023-12-18 RX ADMIN — ROCURONIUM BROMIDE 20 MG: 10 INJECTION, SOLUTION INTRAVENOUS at 08:38

## 2023-12-18 RX ADMIN — PROPOFOL 30 MG: 10 INJECTION, EMULSION INTRAVENOUS at 09:53

## 2023-12-18 RX ADMIN — ISOPROTERENOL HYDROCHLORIDE 0.2 MG: 0.2 INJECTION, SOLUTION INTRACARDIAC; INTRAMUSCULAR; INTRAVENOUS; SUBCUTANEOUS at 09:38

## 2023-12-18 RX ADMIN — PROPOFOL 150 MG: 10 INJECTION, EMULSION INTRAVENOUS at 07:58

## 2023-12-18 RX ADMIN — PHENYLEPHRINE HYDROCHLORIDE 0.1 MCG/KG/MIN: 10 INJECTION INTRAVENOUS at 08:13

## 2023-12-18 RX ADMIN — SODIUM CHLORIDE, POTASSIUM CHLORIDE, SODIUM LACTATE AND CALCIUM CHLORIDE: 600; 310; 30; 20 INJECTION, SOLUTION INTRAVENOUS at 07:58

## 2023-12-18 RX ADMIN — FENTANYL CITRATE 25 MCG: 50 INJECTION, SOLUTION INTRAMUSCULAR; INTRAVENOUS at 09:57

## 2023-12-18 RX ADMIN — FENTANYL CITRATE 25 MCG: 50 INJECTION, SOLUTION INTRAMUSCULAR; INTRAVENOUS at 09:18

## 2023-12-18 RX ADMIN — HEPARIN SODIUM 6000 ML: 200 INJECTION, SOLUTION INTRAVENOUS at 07:30

## 2023-12-18 RX ADMIN — HEPARIN SODIUM 7000 UNITS: 1000 INJECTION, SOLUTION INTRAVENOUS; SUBCUTANEOUS at 08:33

## 2023-12-18 RX ADMIN — HEPARIN SODIUM 2000 UNITS: 1000 INJECTION, SOLUTION INTRAVENOUS; SUBCUTANEOUS at 09:23

## 2023-12-18 RX ADMIN — PHENYLEPHRINE HYDROCHLORIDE 80 MCG: 10 INJECTION INTRAVENOUS at 07:59

## 2023-12-18 RX ADMIN — ONDANSETRON 4 MG: 2 INJECTION INTRAMUSCULAR; INTRAVENOUS at 09:46

## 2023-12-18 ASSESSMENT — PAIN DESCRIPTION - PAIN TYPE
TYPE: ACUTE PAIN
TYPE: SURGICAL PAIN
TYPE: ACUTE PAIN
TYPE: SURGICAL PAIN
TYPE: SURGICAL PAIN
TYPE: ACUTE PAIN
TYPE: SURGICAL PAIN
TYPE: CHRONIC PAIN

## 2023-12-18 ASSESSMENT — FIBROSIS 4 INDEX: FIB4 SCORE: 1.27

## 2023-12-18 NOTE — OP REPORT
"[facility]   Electrophysiology Procedure Note    Procedure(s) Performed:   1) Atrial fibrillation ablation  2) Ablation of additional mechanism of tachycardia (atypical L sided flutter)  3) Arrhythmia induction with IV drug infusion    Indication(s):  Atypical flutter  Paroxysmal atrial fibrillation    Physician(s): Ajay Lee M.D.     Resident/Assistant(s): None     Anesthesia: General endotracheal anesthetic.     Specimen(s) Removed: None     Estimated Blood Loss:  30cc     Complications:  None     Description of Procedure:   After informed written consent, the patient was brought to the EP lab in the fasting, non-sedated state. The patient was prepped and draped in the usual sterile fashion. Femoral venous access was obtained using the modified Seldinger technique. In the right femoral vein, 4 sheaths (8,8,8,7 Fr) were inserted over 0.035” guidewires. A deflectable decapolar catheter was advanced to the CS position. Baseline rhythm was atrial flutter. Flutter  msec with eccentric appearing activation with earliest activation mid-CS. There was significant paroxysmal tortuosity in the R iliac vein and our sheaths were exchanged with long sheaths over 0.035\" wire in order to advance catheters. An intracardiac echo (ICE) catheter was advanced to the right atrium. ICE was used to identify the atrial septum, left atrial appendage, and pulmonary veins and stuart the anatomic structures to superimpose on our 3D electroanatomic map using CARTOSound. During the procedure, ICE was utilized to localize the ablation catheter, monitor for thrombus formation, and to exclude pericardial effusion. Intravenous heparin was administered to maintain -350 seconds. Mapping of the RA was performed with a Biosense OctaRay sheath. This showed earliest broad activation along the high interatrial septum only slightly earlier than earliest CS catheter suggestive of L sided mechanism. Transseptal left heart catheterization " was performed under intracardiac echo and hemodynamic guidance. A Franklin needle was inserted into the 8 Fr sheaths (SL1) for transseptal puncture and placement of sheaths in the left atrium, however the proximal tortuosity actually made it difficult to advance and manipulate our trans-septal needle. Instead a VersaCross system was used to cross. We originally planned for x 2 trans-septal accesses but given difficulty only performed the one.  Mapping of the pulmonary veins and left atrium was performed using CARTO3 FAM and a deflectable multipolar mapping catheter (Biosense OctaRaThink Global). The R sided veins appeared electrically silent/isolated at baseline. There was significant heterogeneous low voltage areas outside the L sided PVs with significant voltage within antrum and bernardino, and low LA gutter. There was early meets late along the mid Coumadin ridgeg. Activation map showed re-entry around the L sided PVs with breakout along the superior/mid ridge and area of very slow conduction just below this. Ablation was performed along the site of earliest activation which immediately slowed the CL to about 320 msec. Further ablation along the mid-ridge failed to terminate. Ablation on the ridge on appendage side superior to our initial site of ablation further slowed the flutter. Termination occurred with ablation slightly superior to this site. Additionally we completed wide antral circumferential ablation around the L sided PVs to complete isolate the PV. Ablation was performed using an 3.5 mm deflectable irrigated force contact catheter (Biosense ST SF) at primarily 30-40 W power. We tested for exit block and re-mapped demonstrating L sided PV isolation. We also tested for exit block within the R sided PV antrum which were isolated at baseline and we did not perform additional ablation around the R sided PVs. Further burst pacing down to  mesc along the single/double/triple extra stimuli on/off isuprel infusion at up  to 2 mcg/min failed to induce arrhythmia (single PACs/junctionals). Although the presenting tachy was initially around the L sided PVs and technically roof dependent, slow zone of conduction within the coumadin ridge was critical area and site of successful termination without inducibility, we did not perform a roof line. At the end of the procedure, heparin was reversed with protamine, the catheter and sheaths were removed, and hemostasis was achieved by manual compression and Vascade MVP closure device. Following recovery from anesthesia, the patient was transferred to the PPU in good condition.       Total ablation time: 1102 seconds    Fluoroscopy time: 12.4 minutes     Electrophysiologic Findings:    1. Sinus  598 ms,  ms, HV 48 ms. AVBCL = 310 ms. AERP 600/250 ms. AVNERP 500/300 ms.    Impressions:    1. Paroxysmal atrial fibrillation and atypical L sided flutter  2. Successful RF ablation pulmonary vein isolation procedure.    3. Successful RF ablation of atypical flutter    Recommendations:  1. Resume anticoagulation.  2. Start carafate x 2 weeks and daily PPI x 1 month.  3. Transfer to [facility] bedrest.

## 2023-12-18 NOTE — ANESTHESIA PROCEDURE NOTES
Airway    Date/Time: 12/18/2023 8:01 AM    Performed by: Olaf De La Cruz D.O.  Authorized by: Olaf De La Cruz D.O.    Location:  OR  Urgency:  Elective  Indications for Airway Management:  Anesthesia      Spontaneous Ventilation: absent    Sedation Level:  Deep  Preoxygenated: Yes    Patient Position:  Sniffing  MILS Maintained Throughout: No    Mask Difficulty Assessment:  1 - vent by mask  Final Airway Type:  Endotracheal airway  Final Endotracheal Airway:  ETT  Cuffed: Yes    Technique Used for Successful ETT Placement:  Video laryngoscopy  Devices/Methods Used in Placement:  Intubating stylet    Insertion Site:  Oral  Blade Type:  Glide  Laryngoscope Blade/Videolaryngoscope Blade Size:  3  ETT Size (mm):  7.0  Measured from:  Teeth  ETT to Teeth (cm):  21  Placement Verified by: auscultation and capnometry    Cormack-Lehane Classification:  Grade I - full view of glottis  Number of Attempts at Approach:  1  Ventilation Between Attempts:  None  Number of Other Approaches Attempted:  0

## 2023-12-18 NOTE — DISCHARGE INSTRUCTIONS
[facility] Heart and Vascular Health Post Ablation Patient Instructions:  No lifting > 10 lbs x 1 week.      No soaking in baths, hot tubs, pools x 1 week.  May shower the day after discharge and take off groin dressings and leave  sites uncovered.  Continue to monitor sites daily for warmth, redness, discolored drainage.  It is common to have a small lump in the area where the cather was (usually the size of a marble); this will go away but takes approximately 6 weeks to normalize.     3.   Please take all medications as prescribed to you; please do not stop any medications prescribed post ablation unless directed by your healthcare provider.      4.   Please do not miss any doses of your blood thinner (if you have been started on, or take chronic blood thinners) without discussion with your healthcare provider first.     5.   Please walk and take deep breaths after discharge.  After discharge, if you experience neurological changes/signs of stroke or high fever you should be seen in the emergency dept.     6.   It is possible you may experience some chest discomfort or chest tightness post ablation.  This is usually secondary to inflammation and irritation of the tissues at the area of the ablation.  If this occurs, it is advised to try 400 mg of Ibuprofen with food as needed up to three times a day for a maximum of two days.  This should help to decrease pain and tissue inflammation.          **Please notify the office ([phone]) if this occurs.         ** DO NOT TAKE Ibuprofen IF HISTORY OF ALLERGY, SIGNIFICANT BLEEDING OR KIDNEY DISEASE WITHOUT DISCUSSING WITH YOUR CARDIOLOGY PROVIDER FIRST.          ** If pain becomes severe or you have additional symptoms you may need to be medically evaluated; please contact the cardiology office ([phone]) for further direction.     7. It is possible that you may experience arrhythmia/Atrial Fibrillation post ablation.  This is secondary to irritation and  inflammation of the cardiac tissues from the ablation.  If you have atrial fibrillation all day or feel poorly with it, please notify your cardiologist's via phone ([phone]) or mychart.      8.  Please contact call our office ([phone]) or message via [employer] message if you have any questions or concerns post procedurally.    9. You need to be seen for post ablation follow up 3-4 weeks post procedure. An appointment is scheduled for you.  Please contact the office ([phone]) if you need to change your appointment.

## 2023-12-18 NOTE — OR NURSING
1007 Pt arrived from cath lab via gurney. Report given by anesthesia and RN. Arousable. 6L mask even non labored breathing. Lungs clear airway patent. SR with occasional pvc. Skin pink warm dry. Piv patent. R groin soft dressing cdi, scant serosanguinous shadowing, no hematoma. RLE 3+ pedal pulse, pink warm brisk cap refill. Strict bed rest x2 hours.     1018 EKG at bedside     1020 Isidro, spouse, at bedside    1024 c/o nausea, treated per mar. Quease ease and emesis bag.     1039 c/o headache, treated per mar.     1119 oral suction, clear sputum. R groin no new drainage. RLE denies numbness tingling, wiggles toes.     1125 headache resolved. Nausea improved.     1149 resting comfortably. Even non labored breathing. RA. RLE cms and neuro intact. R groin, no new drainage.     1157 personal belongings with pt.     1207 strict bed rest complete. sitting up.  R groin soft no drainage, no hematoma.     1214 ambulates unit with walker. R groin, soft, no new drainage, no hematoma. Cms and neuro intact. Steady on feet.     1215 pt dressed by Isidro, spouse.     1229 drinking sips of water    1238 Denies pain and nausea.     1246 Suzette LOZANON at bedside    1258 PIV removed. Discharge instructions given. Signed. R groin no changes.     1306 escorted via wheelchair and RN to CT.

## 2023-12-18 NOTE — ANESTHESIA PREPROCEDURE EVALUATION
Date/Time: 23 0800    Scheduled providers: Ajay Arauz M.D.; Olaf De aL Cruz D.O.    Procedure: CL-EP ABLATION ATRIAL FIBRILLATION    Diagnosis:       Chronic atrial fibrillation (HCC) [I48.20]      Chronic atrial fibrillation, unspecified [I48.20]    Indications: See Associated Dx    Location: [facility] - Cath Lab - [facility] H&P:  PAST MEDICAL HISTORY:   68 y.o. male who presents for Procedure(s):  EC-MARINA W/O CONT,CL-EP ABLATION ATRIAL FIBRILLATION WITH ANESTHESIA, CARTO-DR. ARAUZ.  He has current and past medical problems significant for:    - Interstitial lung disease s/p bilateral lung transplant 2022 (tacrolimus, mycophenolate, prednisone, albuterol)  - pA-fib/flutter (metoprolol, xarelto)  - HTN (amlodipine, spironolactone, sacubitril-valsartan [entresto])  - CVA w/ R-sided great toe weakness (2023)  - RA  - s/p C5-7 ACDF  - SUPA on CPAP  - GERD s/p Nissen    Past Medical History:   Diagnosis Date    Colitis     Diverticulitis     Hyperlipidemia     Hypertension     Shortness of breath     Sleep apnea     Stroke (HCC)     Wears glasses        SMOKING/ALCOHOL/RECREATIONAL DRUG USE:  Social History     Tobacco Use    Smoking status: Former     Current packs/day: 0.00     Average packs/day: 0.5 packs/day for 26.0 years (13.0 ttl pk-yrs)     Types: Cigarettes     Start date:      Quit date:      Years since quittin.9    Smokeless tobacco: Never   Vaping Use    Vaping Use: Never used   Substance Use Topics    Alcohol use: Not Currently    Drug use: Yes     Types: Marijuana, Oral     Comment: gummies      Social History     Substance and Sexual Activity   Drug Use Yes    Types: Marijuana, Oral    Comment: gummies        PAST SURGICAL HISTORY:  Past Surgical History:   Procedure Laterality Date    FUSION, SPINE, LUMBAR, PLIF  2016    Procedure: LUMBAR FUSION POSTERIOR  L2-4 ;  Surgeon: Olaf Rich M.D.;  Location: SURGERY [facility];  Service:         ALLERGIES:   Allergies   Allergen Reactions    Tape      RASH Severe    Other Drug      Other reaction(s): .Unknown  surgical tape       MEDICATIONS:  No current facility-administered medications on file prior to encounter.     Current Outpatient Medications on File Prior to Encounter   Medication Sig Dispense Refill    tacrolimus (PROGRAF) 0.5 MG Cap Take 1.5 mg by mouth.      rivaroxaban (XARELTO) 20 MG Tab tablet Take 20 mg by mouth with dinner.      Empagliflozin 25 MG Tab Take  by mouth. (Patient not taking: Reported on 12/18/2023)      Metoprolol Succinate 50 MG Capsule ER 24 Hour Sprinkle Take  by mouth.      rosuvastatin (CRESTOR) 20 MG Tab Take 40 mg by mouth every evening.      sacubitril-valsartan (ENTRESTO) 24-26 MG Tab Take 1 Tablet by mouth 2 times a day.      spironolactone (ALDACTONE) 25 MG Tab Take 25 mg by mouth every day.      calcium carbonate (TUMS) 500 MG Chew Tab Chew 1,000 mg 2 times a day.      vitamin D3 (CHOLECALCIFEROL) 1000 Unit (25 mcg) Tab Take 1,000 Units by mouth every day.      multivitamin Tab Take 1 Tablet by mouth every day.      ondansetron (ZOFRAN ODT) 8 MG TABLET DISPERSIBLE Take 8 mg by mouth every 8 hours as needed for Nausea.      PANTOPRAZOLE SODIUM PO Take  by mouth.      Ferrous Sulfate (IRON PO) Take  by mouth.      OFEV 150 MG Cap Take 1 capsule twice daily. (Patient not taking: Reported on 12/11/2023) 60 Capsule 11    mycophenolate (CELLCEPT) 500 MG tablet Take 500 mg by mouth every day.      albuterol (PROVENTIL) 2.5mg/3ml Nebu Soln solution for nebulization Take 3 mL by nebulization every four hours as needed for Shortness of Breath. (Patient not taking: Reported on 12/11/2023) 60 Each 3    predniSONE (DELTASONE) 10 MG Tab Take 10 mg by mouth. PRN      benzonatate (TESSALON) 100 MG Cap TAKE 1 CAPSULE BY MOUTH THREE TIMES DAILY AS NEEDED FOR COUGH (Patient not taking: Reported on 12/11/2023) 60 capsule 3    azithromycin (ZITHROMAX) 500 MG tablet Three times a  week. One tablet po mon/wed/friday (Patient not taking: Reported on 12/11/2023)      amLODIPine (NORVASC) 5 MG Tab Take 10 mg by mouth every day. Take 1 tablet by mouth every day (Patient not taking: Reported on 12/11/2023)      ezetimibe (ZETIA) 10 MG Tab TK 1 T PO ONE TIME D      sulfamethoxazole-trimethoprim (BACTRIM DS) 800-160 MG tablet Take  by mouth. Take 1 tablet by mouth two times a day      albuterol (PROVENTIL) 2.5mg/3ml Nebu Soln solution for nebulization Take 3 mL by nebulization 2 times a day as needed for Shortness of Breath. (Patient not taking: Reported on 12/11/2023) 60 Bullet 3    omeprazole (PRILOSEC) 20 MG delayed-release capsule Take 20 mg by mouth every day. (Patient not taking: Reported on 12/11/2023)      albuterol (PROAIR HFA) 108 (90 Base) MCG/ACT Aero Soln inhalation aerosol Inhale 2 Puffs by mouth every 6 hours as needed for Shortness of Breath. (Patient not taking: Reported on 12/11/2023)         LABS:  Lab Results   Component Value Date/Time    HEMOGLOBIN 11.6 (L) 12/18/2023 0700    HEMATOCRIT 37.6 (L) 12/18/2023 0700    WBC 8.4 12/18/2023 0700     Lab Results   Component Value Date/Time    SODIUM 141 12/18/2023 0700    POTASSIUM 4.1 12/18/2023 0700    CHLORIDE 105 12/18/2023 0700    CO2 23 12/18/2023 0700    GLUCOSE 104 (H) 12/18/2023 0700    BUN 32 (H) 12/18/2023 0700    CALCIUM 8.8 12/18/2023 0700         PREVIOUS ANESTHETICS: See EMR  __________________________________________    Relevant Problems   CARDIAC   (positive) HTN (hypertension), benign      Other   (positive) ILD (interstitial lung disease) (HCC)   (positive) Rheumatoid arthritis (HCC)       Physical Exam    Airway   Mallampati: II  TM distance: >3 FB  Neck ROM: full       Cardiovascular - normal exam  Rhythm: irregular  Rate: abnormal  (-) murmur     Dental - normal exam           Pulmonary - normal exam  Breath sounds clear to auscultation     Abdominal    Neurological - normal exam                   Anesthesia  Plan    ASA 3   ASA physical status 3 criteria: a thrombophilic disease requiring anticoagulation and CVA or TIA - history (> 3 months)    Plan - general       Airway plan will be ETT          Induction: intravenous    Postoperative Plan: Postoperative administration of opioids is intended.    Pertinent diagnostic labs and testing reviewed    Informed Consent:    Anesthetic plan and risks discussed with patient.    Use of blood products discussed with: patient whom consented to blood products.

## 2023-12-19 NOTE — PROGRESS NOTES
Seen in afternoon same day discharge EP rounds.  S/P Afib ablation with PVI and ablation of atypical flutter by Rosa for persistent Afib and paroxysmal Afib .        Conclusions per Op Note dated 12/18/23:  Total ablation time: 1102 seconds  Fluoroscopy time: 12.4 minutes  Electrophysiologic Findings:    1. Sinus  598 ms,  ms, HV 48 ms. AVBCL = 310 ms. AERP 600/250 ms. AVNERP 500/300 ms.  Impressions:    1. Paroxysmal atrial fibrillation and atypical L sided flutter  2. Successful RF ablation pulmonary vein isolation procedure.    3. Successful RF ablation of atypical flutter     Monitored rhythm has been sinus rhythm post procedurally throughout his monitored recovery.  Vital signs are stable. Right femoral access site is without evidence of ongoing bleeding, no hematoma, no pain to site.   He has ambulated and site has remained stable.      I have verified that new discharge prescriptions have been sent to correct pharmacy, patient has active anticoagulation prescription, and provided education about importance of esophageal prophylaxis post ablation.      Discharge instructions discussed with patient:  [facility] Heart and Vascular Health Post Ablation Patient Instructions:  No lifting > 10 lbs x 1 week.    No soaking in baths, hot tubs, pools x 1 week.  May shower the day after discharge and take off groin dressings and leave uncovered.  Continue to monitor sites daily for warmth, redness, discolored drainage.  It is common to have a small lump in the area where the cather was (usually the size of a small marble); this will go away but takes approximately 6 weeks to normalize.   3.   Please take all medications as prescribed to you; please do not stop any medications prescribed post ablation unless directed by your healthcare provider.    4.   Please do not miss any doses of your blood thinner (if you have been started on, or take chronic blood thinners) without discussion with your healthcare  provider first.   5.   Please walk and take deep breaths after discharge.  After discharge, if  experiences neurological changes/signs of stroke, high fever, you should be seen in the emergency dept.   6.   It is possible you may experience some chest discomfort or chest tightness post ablation.  This is usually secondary to inflammation and irritation of the tissues at the area of the ablation.  If this occurs, it is advised to try 400 mg of Ibuprofen with food as needed up to three times a day for a maximum of two days.  This should help to decrease pain and tissue inflammation.          **Please notify the office ([phone]) if this occurs.         ** DO NOT TAKE Ibuprofen IF HISTORY OF SIGNIFICANT BLEEDING OR KIDNEY DISEASE WITHOUT DISCUSSING WITH YOUR CARDIOLOGY PROVIDER FIRST.          ** If pain becomes severe or you have additional symptoms you may need to be medically evaluated; please contact the cardiology office ([phone]) for further direction.   7. It is possible that you may experience arrhythmia/Atrial Fibrillation post ablation.  This is secondary to irritation and inflammation of the cardiac tissues from the ablation.  If you have atrial fibrillation all day or feel poorly with it, please notify your cardiologist's via phone ([phone]) or [employer].    8.  Please contact call our office ([phone]) or message via [employer] message if you have any questions or concerns post procedurally.  9. You need to be seen for post ablation follow up 2-4 weeks post procedure.  If you do not have a follow up appointment scheduled, please call [phone] to schedule your follow up appointment.

## 2023-12-19 NOTE — ANESTHESIA TIME REPORT
Anesthesia Start and Stop Event Times       Date Time Event    12/18/2023 0720 Ready for Procedure     0748 Anesthesia Start     1010 Anesthesia Stop          Responsible Staff  12/18/23      Name Role Begin End    Olaf De La Cruz D.O. Anesth 0748 1010          Overtime Reason:  no overtime (within assigned shift)    Comments:

## 2023-12-19 NOTE — ANESTHESIA POSTPROCEDURE EVALUATION
Patient: Maximus Bender    Procedure Summary       Date: 12/18/23 Room / Location: [facility] Imaging - Cath [facility]    Anesthesia Start: 0748 Anesthesia Stop: 1010    Procedure: CL-EP ABLATION ATRIAL FIBRILLATION Diagnosis:       Chronic atrial fibrillation (HCC)      Chronic atrial fibrillation, unspecified      (See Associated Dx)    Scheduled Providers: Ajay Lee M.D.; Olaf De La Cruz D.O. Responsible Provider: Olaf De La Cruz D.O.    Anesthesia Type: general ASA Status: 3            Final Anesthesia Type: general  Last vitals  BP   Blood Pressure : 90/57    Temp   36.8 °C (98.3 °F)    Pulse   82   Resp   20    SpO2   98 %      Anesthesia Post Evaluation    Patient location during evaluation: PACU  Patient participation: complete - patient participated  Level of consciousness: awake and alert    Airway patency: patent  Anesthetic complications: no  Cardiovascular status: hemodynamically stable  Respiratory status: acceptable  Hydration status: euvolemic    PONV: none          No notable events documented.     Nurse Pain Score: 0 (NPRS)

## 2023-12-30 LAB — EKG IMPRESSION: NORMAL

## 2023-12-30 PROCEDURE — 93010 ELECTROCARDIOGRAM REPORT: CPT | Performed by: INTERNAL MEDICINE

## 2024-01-08 ENCOUNTER — OFFICE VISIT (OUTPATIENT)
Dept: CARDIOLOGY | Facility: MEDICAL CENTER | Age: 69
End: 2024-01-08
Attending: NURSE PRACTITIONER
Payer: MEDICARE

## 2024-01-08 ENCOUNTER — TELEPHONE (OUTPATIENT)
Dept: CARDIOLOGY | Facility: MEDICAL CENTER | Age: 69
End: 2024-01-08

## 2024-01-08 VITALS
OXYGEN SATURATION: 97 % | BODY MASS INDEX: 19.55 KG/M2 | HEIGHT: 68 IN | RESPIRATION RATE: 20 BRPM | HEART RATE: 87 BPM | DIASTOLIC BLOOD PRESSURE: 58 MMHG | SYSTOLIC BLOOD PRESSURE: 90 MMHG | WEIGHT: 129 LBS

## 2024-01-08 DIAGNOSIS — G47.33 OSA ON CPAP: ICD-10-CM

## 2024-01-08 DIAGNOSIS — Z98.890 S/P ABLATION OF ATRIAL FIBRILLATION: ICD-10-CM

## 2024-01-08 DIAGNOSIS — Z94.2 LUNG TRANSPLANT STATUS, BILATERAL (HCC): ICD-10-CM

## 2024-01-08 DIAGNOSIS — Z86.79 S/P ABLATION OF ATRIAL FIBRILLATION: ICD-10-CM

## 2024-01-08 DIAGNOSIS — I48.0 PAROXYSMAL ATRIAL FIBRILLATION (HCC): ICD-10-CM

## 2024-01-08 DIAGNOSIS — E78.5 DYSLIPIDEMIA: ICD-10-CM

## 2024-01-08 DIAGNOSIS — I50.32 CHRONIC HEART FAILURE WITH PRESERVED EJECTION FRACTION (HCC): ICD-10-CM

## 2024-01-08 LAB — EKG IMPRESSION: NORMAL

## 2024-01-08 PROCEDURE — 93010 ELECTROCARDIOGRAM REPORT: CPT | Performed by: INTERNAL MEDICINE

## 2024-01-08 PROCEDURE — 93005 ELECTROCARDIOGRAM TRACING: CPT | Performed by: NURSE PRACTITIONER

## 2024-01-08 PROCEDURE — 99213 OFFICE O/P EST LOW 20 MIN: CPT | Performed by: NURSE PRACTITIONER

## 2024-01-08 PROCEDURE — 3074F SYST BP LT 130 MM HG: CPT | Performed by: NURSE PRACTITIONER

## 2024-01-08 PROCEDURE — 99215 OFFICE O/P EST HI 40 MIN: CPT | Performed by: NURSE PRACTITIONER

## 2024-01-08 PROCEDURE — 3078F DIAST BP <80 MM HG: CPT | Performed by: NURSE PRACTITIONER

## 2024-01-08 ASSESSMENT — FIBROSIS 4 INDEX: FIB4 SCORE: 0.94

## 2024-01-08 ASSESSMENT — ENCOUNTER SYMPTOMS
PALPITATIONS: 0
NEUROLOGICAL NEGATIVE: 1
PND: 0
ORTHOPNEA: 0
DIZZINESS: 0
SHORTNESS OF BREATH: 0
EYES NEGATIVE: 1
MUSCULOSKELETAL NEGATIVE: 1
HALLUCINATIONS: 0
GASTROINTESTINAL NEGATIVE: 1
RESPIRATORY NEGATIVE: 1
BRUISES/BLEEDS EASILY: 0
NERVOUS/ANXIOUS: 0
CONSTITUTIONAL NEGATIVE: 1
NAUSEA: 0
CLAUDICATION: 0
DEPRESSION: 0
SENSORY CHANGE: 0
WHEEZING: 0

## 2024-01-08 NOTE — PROGRESS NOTES
Atrial Fibrillation Follow up Note    DOS: 1/8/2024  [id number]  Maximus Bender    Chief complaint/Reason for consult: post op ablation    HPI: Pt is a 68 y.o. male who presents to the clinic today in follow up for post op ablation. Patient has a past medical history significant for but not limited to: interstitial lung disease S/P bilateral lung transplant 2022 , ablation 2023, HFpEF, h/o prostate cancer, hypertension, hyperlipidemia. Patient underwent ablation approximately 3 weeks ago with targeted sites of isolation: all four pulmonary veins and the coumadin ridge for atypical flutter. Patient feeling well. Needs to be established for long term cardiac care. Due to his heart failure regimen of medications we will set him up with Dr. Worthy. Patient throat and groin doing well. Blood pressure a little soft so we will cut metoprolol in half to 25mg daily, further adjustments to be made by Dr. Worthy. Discussed procedure in detail and healing expectations. No bleeding issues form Xarelto.       Past Medical History:   Diagnosis Date    A-fib (HCC)     Colitis     Diverticulitis     Hyperlipidemia     Hypertension     Lung transplant recipient (HCC)     Bilateral lung transplant sec to interstitial lung disease    RA (rheumatoid arthritis) (HCC)     Shortness of breath     Sleep apnea     CPAP    Stroke (HCC)     Wears glasses        Past Surgical History:   Procedure Laterality Date    FUSION, SPINE, LUMBAR, PLIF  11/1/2016    Procedure: LUMBAR FUSION POSTERIOR  L2-4 ;  Surgeon: Olaf Rich M.D.;  Location: SURGERY [facility];  Service:        Social History     Socioeconomic History    Marital status:      Spouse name: Not on file    Number of children: Not on file    Years of education: Not on file    Highest education level: Not on file   Occupational History    Not on file   Tobacco Use    Smoking status: Former     Current packs/day: 0.00     Average packs/day: 0.5 packs/day for 26.0  years (13.0 ttl pk-yrs)     Types: Cigarettes     Start date:      Quit date:      Years since quittin.0    Smokeless tobacco: Never   Vaping Use    Vaping Use: Never used   Substance and Sexual Activity    Alcohol use: Not Currently    Drug use: Not Currently    Sexual activity: Not on file   Other Topics Concern    Not on file   Social History Narrative    Not on file     Social Determinants of Health     Financial Resource Strain: Not on file   Food Insecurity: Not on file   Transportation Needs: Not on file   Physical Activity: Not on file   Stress: Not on file   Social Connections: Not on file   Intimate Partner Violence: Not on file   Housing Stability: Not on file       History reviewed. No pertinent family history.    Allergies   Allergen Reactions    Tape      RASH Severe    Other Drug      Other reaction(s): .Unknown  surgical tape       Current Outpatient Medications   Medication Sig Dispense Refill    tacrolimus (PROGRAF) 0.5 MG Cap Take 1.5 mg by mouth. 2 mg in the am and 1.5 mg in the pm      rivaroxaban (XARELTO) 20 MG Tab tablet Take 20 mg by mouth with dinner.      Empagliflozin 25 MG Tab Take  by mouth.      Metoprolol Succinate 50 MG Capsule ER 24 Hour Sprinkle Take  by mouth.      rosuvastatin (CRESTOR) 20 MG Tab Take 40 mg by mouth every evening.      sacubitril-valsartan (ENTRESTO) 24-26 MG Tab Take 1 Tablet by mouth 2 times a day.      calcium carbonate (TUMS) 500 MG Chew Tab Chew 1,000 mg 2 times a day.      vitamin D3 (CHOLECALCIFEROL) 1000 Unit (25 mcg) Tab Take 1,000 Units by mouth every day.      multivitamin Tab Take 1 Tablet by mouth every day.      ondansetron (ZOFRAN ODT) 8 MG TABLET DISPERSIBLE Take 8 mg by mouth every 8 hours as needed for Nausea.      PANTOPRAZOLE SODIUM PO Take  by mouth.      Ferrous Sulfate (IRON PO) Take  by mouth.      mycophenolate (CELLCEPT) 500 MG tablet Take 500 mg by mouth every day.      predniSONE (DELTASONE) 10 MG Tab Take 10 mg by mouth.  PRN      ezetimibe (ZETIA) 10 MG Tab TK 1 T PO ONE TIME D      sulfamethoxazole-trimethoprim (BACTRIM DS) 800-160 MG tablet Take  by mouth. Take 1 tablet by mouth two times a day      albuterol (PROVENTIL) 2.5mg/3ml Nebu Soln solution for nebulization Take 3 mL by nebulization 2 times a day as needed for Shortness of Breath. (Patient not taking: Reported on 12/11/2023) 60 Bullet 3     No current facility-administered medications for this visit.       Vitals:    01/08/24 1452   BP: 90/58   Pulse: 87   Resp: 20   SpO2: 97%         Review of Systems   Constitutional: Negative.  Negative for malaise/fatigue.   HENT: Negative.     Eyes: Negative.    Respiratory: Negative.  Negative for shortness of breath and wheezing.    Cardiovascular:  Negative for chest pain, palpitations, orthopnea, claudication, leg swelling and PND.   Gastrointestinal: Negative.  Negative for nausea.   Genitourinary: Negative.    Musculoskeletal: Negative.    Skin: Negative.    Neurological: Negative.  Negative for dizziness and sensory change.   Endo/Heme/Allergies: Negative.  Does not bruise/bleed easily.   Psychiatric/Behavioral:  Negative for depression and hallucinations. The patient is not nervous/anxious.         EKG interpreted by me: Sinus with multiform PVC's    Physical Exam  Constitutional:       Appearance: Normal appearance.   HENT:      Head: Normocephalic.   Eyes:      Pupils: Pupils are equal, round, and reactive to light.   Neck:      Vascular: No JVD.   Cardiovascular:      Rate and Rhythm: Normal rate and regular rhythm.      Pulses: Normal pulses.      Heart sounds: Normal heart sounds.   Pulmonary:      Effort: Pulmonary effort is normal.      Breath sounds: Normal breath sounds.   Abdominal:      General: Abdomen is flat.      Palpations: Abdomen is soft.   Musculoskeletal:      Cervical back: Normal range of motion.      Right lower leg: No edema.      Left lower leg: No edema.   Skin:     General: Skin is warm and dry.  "  Neurological:      Mental Status: He is alert and oriented to person, place, and time.   Psychiatric:         Mood and Affect: Mood normal.         Behavior: Behavior normal.          Data:  Lipids:   No results found for: \"CHOLSTRLTOT\", \"TRIGLYCERIDE\", \"HDL\", \"LDL\"     BMP:  Lab Results   Component Value Date/Time    SODIUM 141 12/18/2023 0700    POTASSIUM 4.1 12/18/2023 0700    CHLORIDE 105 12/18/2023 0700    CO2 23 12/18/2023 0700    GLUCOSE 104 (H) 12/18/2023 0700    BUN 32 (H) 12/18/2023 0700    CREATININE 1.12 12/18/2023 0700    CALCIUM 8.8 12/18/2023 0700    ANION 13.0 12/18/2023 0700       GFR:  Lab Results   Component Value Date/Time    IFAFRICA 111 05/18/2021 0731    IFNOTAFR 96 05/18/2021 0731        TSH:   No results found for: \"TSHULTRASEN\"    MAGNESIUM:  Lab Results   Component Value Date/Time    MAGNESIUM 2.3 11/03/2016 0248    MAGNESIUM 2.1 10/31/2016 0338        THYROXINE (T4):   No results found for: \"FREEDIR\"     CBC:   Lab Results   Component Value Date/Time    WBC 8.4 12/18/2023 07:00 AM    RBC 4.60 (L) 12/18/2023 07:00 AM    HEMOGLOBIN 11.6 (L) 12/18/2023 07:00 AM    HEMATOCRIT 37.6 (L) 12/18/2023 07:00 AM    MCV 81.7 12/18/2023 07:00 AM    MCH 25.2 (L) 12/18/2023 07:00 AM    MCHC 30.9 (L) 12/18/2023 07:00 AM    RDW 48.4 12/18/2023 07:00 AM    PLATELETCT 252 12/18/2023 07:00 AM    MPV 9.7 12/18/2023 07:00 AM    NEUTSPOLYS 88.5 (H) 10/30/2023 02:23 PM    NEUTSPOLYS 70.30 11/01/2016 12:31 AM    LYMPHOCYTES 6.0 (L) 10/30/2023 02:23 PM    LYMPHOCYTES 10.50 (L) 11/01/2016 12:31 AM    MONOCYTES 4.6 (L) 10/30/2023 02:23 PM    MONOCYTES 10.50 11/01/2016 12:31 AM    EOSINOPHILS 0.4 10/30/2023 02:23 PM    EOSINOPHILS 7.00 (H) 11/01/2016 12:31 AM    BASOPHILS 0.5 10/30/2023 02:23 PM    BASOPHILS 1.00 11/01/2016 12:31 AM    IMMGRAN 1 05/18/2021 07:31 AM    IMMGRAN 0.1 05/18/2021 07:31 AM    IMMGRAN 0.70 11/01/2016 12:31 AM    NRBC 0.00 11/01/2016 12:31 AM    NEUTS 7.50 10/30/2023 02:23 PM    NEUTS 7.41 " "11/01/2016 12:31 AM    LYMPHS 0.50 (L) 10/30/2023 02:23 PM    LYMPHS 1.10 11/01/2016 12:31 AM    MONOS 0.40 10/30/2023 02:23 PM    MONOS 1.10 (H) 11/01/2016 12:31 AM    EOS 0.00 10/30/2023 02:23 PM    EOS 0.74 (H) 11/01/2016 12:31 AM    BASO 0.00 10/30/2023 02:23 PM    BASO 0.10 11/01/2016 12:31 AM    IMMGRANAB 0.07 11/01/2016 12:31 AM    NRBCAB 0.00 11/01/2016 12:31 AM        CBC w/o DIFF  Lab Results   Component Value Date/Time    WBC 8.4 12/18/2023 07:00 AM    RBC 4.60 (L) 12/18/2023 07:00 AM    HEMOGLOBIN 11.6 (L) 12/18/2023 07:00 AM    MCV 81.7 12/18/2023 07:00 AM    MCH 25.2 (L) 12/18/2023 07:00 AM    MCHC 30.9 (L) 12/18/2023 07:00 AM    RDW 48.4 12/18/2023 07:00 AM    MPV 9.7 12/18/2023 07:00 AM       LIVER:  Lab Results   Component Value Date/Time    ALKPHOSPHAT 65 12/18/2023 07:00 AM    ASTSGOT 13 12/18/2023 07:00 AM    ALTSGPT 14 12/18/2023 07:00 AM    TBILIRUBIN 0.4 12/18/2023 07:00 AM       BNP:  No results found for: \"BNPBTYPENAT\"    PT/INR:  Lab Results   Component Value Date/Time    PROTHROMBTM 22.3 (H) 12/18/2023 07:00 AM    PROTHROMBTM 14.1 10/29/2016 02:50 PM    INR 1.93 (H) 12/18/2023 07:00 AM    INR 1.06 10/29/2016 02:50 PM             Impression/Plan:  Chronic heart failure with preserved ejection fraction (HCC)   - patient on Heart failure drug regimen   - last echocardiogram shows normal EF 55% and normal diastolic dysfunction   - patient may have had recovered but previous echocardiogram on file with [facility] showed an EF 65%   - will refer to Dr. Worthy in heart failure for long term management as patient has a complex medical history   - until 19th appointment continue current regimen as directed   - Entresto 24-26mg twice daily, empagliflozin 25mg, metoprolol take half 25mg daily    Lung transplant status, bilateral (HCC)   - followed long term at [facility] and [facility]    SUPA on CPAP   - discussed importance long term treating sleep apnea plays in not " antagonizing the electrical system of the heart    S/P ablation of atrial fibrillation   - patient doing well 3 weeks out   - continue 25mg metoprolol for beta blockade in healing   - can stop omeprazole   - continue Xarelto for stroke protection once daily    - lifestyle modification detailed below    - 3 month longitudinal assessment scheduled in March     Dyslipidemia   - continue rosuvastatin and Zetia   - annual lipid panel        Lifestyle Modification for better heart health care as well as beneficial for prevention of worsening the atrial arrhythmia progression. Lifestyle modification discussed includes diet and reduction of sodium. Patients should eat more of a Mediterranean diet and be very careful with how much processed and fast food they eat. Excessive sodium intake can result in fluid retention which can add additional strain to patients cardiac electrical system. Weight loss can also help long term with cardiac health. For patients with BMI above 25kg/m2, studies have shown a greater chance of progression of disease as well as recurrence after interventions. ARREST-AF study showed less recurrence of AF and slower disease progression in patients with BMI below 27kg/m2. Smoking and vaping should be stopped. Moderation on caffeine and alcohol. Excessive alcohol or caffeine can result in reactions and antagonize the hearts electrical system. Patient that fit the matrix for sleep apnea should be referred for a formal study and should try to be compliant with treatment for sleep apnea. Stay hydrated and stay active. Studies have shown that staying physically active can help heart health. The CARDIO-FIT study showed sedentary individuals lead to faster time of recurrence of arrhythmia. Exercise goals should be trying to maintain 120-200 minutes per week of exercise.      A total of 40 minutes of time was spent on day of encounter reviewing medical record, performing history and examination, counseling,  ordering medication/test/consults, collaborating with referring service, and documentation.    Abdias Moya AGACNP-EP  Cardiac Electrophysiology

## 2024-01-09 NOTE — ASSESSMENT & PLAN NOTE
- patient on Heart failure drug regimen   - last echocardiogram shows normal EF 55% and normal diastolic dysfunction   - patient may have had recovered but previous echocardiogram on file with [facility] showed an EF 65%   - will refer to Dr. Worthy in heart failure for long term management as patient has a complex medical history   - until 19th appointment continue current regimen as directed   - Entresto 24-26mg twice daily, empagliflozin 25mg, metoprolol take half 25mg daily

## 2024-01-09 NOTE — ASSESSMENT & PLAN NOTE
- discussed importance long term treating sleep apnea plays in not antagonizing the electrical system of the heart

## 2024-01-09 NOTE — ASSESSMENT & PLAN NOTE
- patient doing well 3 weeks out   - continue 25mg metoprolol for beta blockade in healing   - can stop omeprazole   - continue Xarelto for stroke protection once daily    - lifestyle modification detailed below    - 3 month longitudinal assessment scheduled in March

## 2024-01-22 ENCOUNTER — TELEPHONE (OUTPATIENT)
Dept: CARDIOLOGY | Facility: MEDICAL CENTER | Age: 69
End: 2024-01-22
Payer: MEDICARE

## 2024-01-22 NOTE — TELEPHONE ENCOUNTER
MT          Caller: Keanu(pt's wife)    Topic/issue: Patient's wife was calling because the previous appointments that were set for her  to schedule in with Dr. Worthy have been canceled and she was asking for a call back about if she should stay with the doctor or set the appointment with someone else      Callback Number: [phone]        Thank you    -Gutierrez STAFFORD

## 2024-01-23 ENCOUNTER — APPOINTMENT (OUTPATIENT)
Dept: CARDIOLOGY | Facility: MEDICAL CENTER | Age: 69
End: 2024-01-23
Attending: NURSE PRACTITIONER
Payer: MEDICARE

## 2024-01-23 NOTE — TELEPHONE ENCOUNTER
Phone Number Called: [phone]    Call outcome: Left detailed message for patient. Informed to call back with any additional questions.    Message: Called to inform patient that MT said it was ok for patient to wait for BD to come back. I scheduled patient for 2/8/24 at 1300 with BD. Advised patient to call back if that does not work for him.

## 2024-02-08 ENCOUNTER — OFFICE VISIT (OUTPATIENT)
Dept: CARDIOLOGY | Facility: MEDICAL CENTER | Age: 69
End: 2024-02-08
Attending: NURSE PRACTITIONER
Payer: MEDICARE

## 2024-02-08 VITALS
DIASTOLIC BLOOD PRESSURE: 42 MMHG | SYSTOLIC BLOOD PRESSURE: 76 MMHG | HEART RATE: 79 BPM | BODY MASS INDEX: 19.28 KG/M2 | HEIGHT: 68 IN | OXYGEN SATURATION: 98 % | WEIGHT: 127.2 LBS | RESPIRATION RATE: 14 BRPM

## 2024-02-08 DIAGNOSIS — I50.32 CHRONIC HEART FAILURE WITH PRESERVED EJECTION FRACTION (HCC): ICD-10-CM

## 2024-02-08 DIAGNOSIS — I10 HTN (HYPERTENSION), BENIGN: ICD-10-CM

## 2024-02-08 DIAGNOSIS — Z86.79 S/P ABLATION OF ATRIAL FIBRILLATION: ICD-10-CM

## 2024-02-08 DIAGNOSIS — I48.0 PAROXYSMAL ATRIAL FIBRILLATION (HCC): ICD-10-CM

## 2024-02-08 DIAGNOSIS — E86.0 DEHYDRATION: ICD-10-CM

## 2024-02-08 DIAGNOSIS — Z98.890 S/P ABLATION OF ATRIAL FIBRILLATION: ICD-10-CM

## 2024-02-08 LAB
BLOOD UREA NITROGEN RWN: 37 MG/DL (ref 8–22)
CALCIUM RWN: 9.5 MG/DL (ref 8.5–10.5)
CHLORIDE RWN: 111 MMOL/L (ref 96–112)
CREATININE RWN: 1.3 MG/DL (ref 0.5–1.4)
EKG IMPRESSION: NORMAL
GFR SERPL CREATININE-BSD FRML MDRD: 56 ML/MIN/{1.73_M2}
GLUCOSE RWN: 117 MG/DL (ref 65–99)
POTASSIUM RWN: 4.5 MMOL/L (ref 3.6–5.5)
SODIUM RWN: 139 MMOL/L (ref 135–145)
TOTAL CARBON DIOXIDE RWN: 28 MMOL/L (ref 20–33)

## 2024-02-08 PROCEDURE — 3074F SYST BP LT 130 MM HG: CPT | Performed by: INTERNAL MEDICINE

## 2024-02-08 PROCEDURE — 3078F DIAST BP <80 MM HG: CPT | Performed by: INTERNAL MEDICINE

## 2024-02-08 PROCEDURE — 80048 BASIC METABOLIC PNL TOTAL CA: CPT | Performed by: INTERNAL MEDICINE

## 2024-02-08 PROCEDURE — 99213 OFFICE O/P EST LOW 20 MIN: CPT | Performed by: INTERNAL MEDICINE

## 2024-02-08 PROCEDURE — 99204 OFFICE O/P NEW MOD 45 MIN: CPT | Performed by: INTERNAL MEDICINE

## 2024-02-08 PROCEDURE — 96360 HYDRATION IV INFUSION INIT: CPT

## 2024-02-08 PROCEDURE — 99213 OFFICE O/P EST LOW 20 MIN: CPT | Mod: 25 | Performed by: INTERNAL MEDICINE

## 2024-02-08 PROCEDURE — 93005 ELECTROCARDIOGRAM TRACING: CPT | Performed by: INTERNAL MEDICINE

## 2024-02-08 PROCEDURE — 93010 ELECTROCARDIOGRAM REPORT: CPT | Performed by: STUDENT IN AN ORGANIZED HEALTH CARE EDUCATION/TRAINING PROGRAM

## 2024-02-08 PROCEDURE — 700105 HCHG RX REV CODE 258: Performed by: INTERNAL MEDICINE

## 2024-02-08 RX ORDER — SODIUM CHLORIDE 9 MG/ML
INJECTION, SOLUTION INTRAVENOUS CONTINUOUS
Status: CANCELLED | OUTPATIENT
Start: 2024-02-08

## 2024-02-08 RX ORDER — SODIUM CHLORIDE 9 MG/ML
INJECTION, SOLUTION INTRAVENOUS CONTINUOUS
Status: DISCONTINUED | OUTPATIENT
Start: 2024-02-08 | End: 2024-02-08

## 2024-02-08 RX ADMIN — SODIUM CHLORIDE 1000 ML: 9 INJECTION, SOLUTION INTRAVENOUS at 14:58

## 2024-02-08 ASSESSMENT — ENCOUNTER SYMPTOMS: DIZZINESS: 1

## 2024-02-08 ASSESSMENT — FIBROSIS 4 INDEX: FIB4 SCORE: 0.94

## 2024-02-08 NOTE — PROGRESS NOTES
[facility]    PatientName:Maximus BenderDate: 2024  :1955    68 y.o.PCP:JULITO Almanza  MRN:[id number]        Problems and Plans    S/P ablation of atrial fibrillation  Noted pulmonary vein isolation with subsequent Coumadin ridge atypical flutter ablation in  currently being followed closely in the electrophysiology department.  No changes were made to his medical therapy at this time    Chronic heart failure with preserved ejection fraction (HCC)  Regarding his heart failure with preserved ejection fraction he remains on appropriate medical therapy however is currently hypotensive this may be iatrogenic in nature as well as a combination of decreased fluid intake.  At this time we will stop his metoprolol therapy as well as his Entresto therapy to try and assess overall blood pressure response.  Will add back in appropriate medical therapy at his next office visit in 1 week and we will arrange for him to undergo normal saline infusion.    HTN (hypertension), benign  Currently hypotensive in the office and will receive normal saline infusion    Paroxysmal atrial fibrillation (HCC)  Regarding his paroxysmal atrial fibrillation he is currently undergone an ablation in  and currently is in a sinus rhythm on his EKG.  I will defer management to the electrophysiology team at this time    Return in about 1 week (around 2/15/2024).      Encounter    Reason for Visit / Chief Complaint: Heart failure with preserved ejection fraction    HPI    68-year-old male with known history of interstitial lung disease status post bilateral lung transplant in  at the [facility], heart failure with preserved ejection fraction, prior prostate cancer, hypertension, dyslipidemia, prior ablation for atrial fibrillation in  presents in clinic for ongoing management of his heart failure with preserved ejection fraction.    Currently, he notes he is  feeling poorly and notes that his blood pressure has been significantly reduced.  Measured blood pressure in the office was 76/42 which he was experiencing significant dizziness.  This was substantiated by his wife.  He has been able to keep his antirejection medications down for his noted lung transplant..      Past Medical History  Past Medical History:   Diagnosis Date    A-fib ([facility])     Colitis     Diverticulitis     Hyperlipidemia     Hypertension     Lung transplant recipient (HCC)     Bilateral lung transplant sec to interstitial lung disease    RA (rheumatoid arthritis) ([facility])     Shortness of breath     Sleep apnea     CPAP    Stroke ([facility])     Wears glasses      Past Surgical History  Past Surgical History:   Procedure Laterality Date    FUSION, SPINE, LUMBAR, PLIF  2016    Procedure: LUMBAR FUSION POSTERIOR  L2-4 ;  Surgeon: Olaf Rich M.D.;  Location: SURGERY [facility];  Service:      Social History  Social History     Socioeconomic History    Marital status:      Spouse name: Not on file    Number of children: Not on file    Years of education: Not on file    Highest education level: Not on file   Occupational History    Not on file   Tobacco Use    Smoking status: Former     Current packs/day: 0.00     Average packs/day: 0.5 packs/day for 26.0 years (13.0 ttl pk-yrs)     Types: Cigarettes     Start date:      Quit date:      Years since quittin.1    Smokeless tobacco: Never   Vaping Use    Vaping Use: Never used   Substance and Sexual Activity    Alcohol use: Not Currently    Drug use: Not Currently    Sexual activity: Not on file   Other Topics Concern    Not on file   Social History Narrative    Not on file     Social Determinants of Health     Financial Resource Strain: Not on file   Food Insecurity: Not on file   Transportation Needs: Not on file   Physical Activity: Not on file   Stress: Not on file   Social Connections: Not on file   Intimate Partner Violence:  "Not on file   Housing Stability: Not on file     Past Family History  History reviewed. No pertinent family history.  Medication(s)    Current Outpatient Medications:     MAGNESIUM PO, 840 mg, Oral, BID, Taking    LACTULOSE PO, 15 mg, Oral, BID, Taking    Nutritional Supplements (NEPRO PO), Take  by mouth every day., Taking    Polyethylene Glycol 3350 (MIRALAX PO), Take  by mouth as needed., PRN    MELATONIN PO, 5 mg, Oral, QHS, Taking    tacrolimus, Take 1.5 mg by mouth. 2 mg in the am and 1.5 mg in the pm, Taking    rivaroxaban, 20 mg, Oral, PM MEAL, Taking    Empagliflozin, 12.5 mg, Oral, DAILY, Taking    Metoprolol Succinate, Take  by mouth., Taking    rosuvastatin, 20 mg, Oral, Q EVENING, Taking    Entresto, 1 Tablet, Oral, BID, Taking    calcium carbonate, 1,000 mg, Oral, BID, Taking    vitamin D3, 1,000 Units, Oral, DAILY, Taking    multivitamin, 1 Tablet, Oral, DAILY, Taking    ondansetron, 8 mg, Oral, Q8HRS PRN, PRN    PANTOPRAZOLE SODIUM PO, Take  by mouth., Taking    Ferrous Sulfate (IRON PO), 325 mg, Oral, Q48HRS, Taking    mycophenolate, 500 mg, Oral, BID, Taking    predniSONE, Take 10 mg by mouth. PRN, Taking    ezetimibe, TK 1 T PO ONE TIME D, Taking    sulfamethoxazole-trimethoprim, 0.5 Tablet, Oral, BID, Taking  Allergies  Tape and Other drug    Review of Systems    A comprehensive 10 system review was conducted and is negative except as noted above in the HPI or here.      Vital Signs  BP (!) 76/42 (BP Location: Left arm, Patient Position: Sitting, BP Cuff Size: Adult)   Pulse 79   Resp 14   Ht 1.727 m (5' 8\")   Wt 57.7 kg (127 lb 3.2 oz)   SpO2 98%   BMI 19.34 kg/m²     Physical Exam  Constitutional:       Appearance: Normal appearance. He is ill-appearing.   HENT:      Head: Normocephalic and atraumatic.      Mouth/Throat:      Mouth: Mucous membranes are moist.      Pharynx: Oropharynx is clear.   Eyes:      Extraocular Movements: Extraocular movements intact.      Conjunctiva/sclera: " "Conjunctivae normal.   Cardiovascular:      Rate and Rhythm: Normal rate and regular rhythm.      Pulses: Normal pulses.      Heart sounds: Normal heart sounds. No murmur heard.     No friction rub. No gallop.   Pulmonary:      Effort: Pulmonary effort is normal.      Breath sounds: Normal breath sounds.   Abdominal:      General: Bowel sounds are normal.      Palpations: Abdomen is soft.   Musculoskeletal:         General: Normal range of motion.      Cervical back: Normal range of motion and neck supple.   Skin:     General: Skin is warm and dry.   Neurological:      General: No focal deficit present.      Mental Status: He is alert and oriented to person, place, and time. Mental status is at baseline.   Psychiatric:         Mood and Affect: Mood normal.         Behavior: Behavior normal.         Thought Content: Thought content normal.         Judgment: Judgment normal.         No results found for: \"TSHULTRASEN\"   No results found for: \"FREET4\"     Lab Results   Component Value Date/Time    HBA1C 5.6 12/12/2022 04:59 AM     No results found for: \"CHOLSTRLTOT\", \"LDL\", \"HDL\", \"TRIGLYCERIDE\"      Lab Results   Component Value Date/Time    SODIUM 141 12/18/2023 07:00 AM    POTASSIUM 4.1 12/18/2023 07:00 AM    CHLORIDE 105 12/18/2023 07:00 AM    CO2 23 12/18/2023 07:00 AM    GLUCOSE 104 (H) 12/18/2023 07:00 AM    BUN 32 (H) 12/18/2023 07:00 AM    CREATININE 1.12 12/18/2023 07:00 AM    BUNCREATRAT 20 05/18/2021 07:31 AM    GLOMRATE 63 10/30/2023 02:23 PM       Lab Results   Component Value Date/Time    ALKPHOSPHAT 65 12/18/2023 07:00 AM    ASTSGOT 13 12/18/2023 07:00 AM    ALTSGPT 14 12/18/2023 07:00 AM    TBILIRUBIN 0.4 12/18/2023 07:00 AM         Imaging  EKG demonstrates sinus rhythm with intermittent PVCs.  RSR prime pattern is noted in V1.  Reviewed interpreted EKG.  Findings are discussed with patient      Total patient time was estimated to be 45 minutes consisting of chart review, direct patient interaction, " medication renewal, plan development and overall communication with the cardiovascular team.        Electronically signed by:   Kadeem Worthy DO, MPH  [facility]    Portions of this note were completed using voice recognition software (Dragon Naturally speaking software) . Occasional transcription errors may have escaped proof reading. I have made every reasonable attempt to correct obvious errors, but I expect that there are errors of grammar and possibly content that I did not discover before finalizing the note.

## 2024-02-08 NOTE — PROGRESS NOTES
Referred by:    Pt here for rehydration    IV inserted @ R hand @ 2:00pm , Labs drawn @  2:01    Med administration of 500ml bolus out of 1000 mls      SYMPTOM ONSET: Dizziness and Fogginess/forgetful for a couple weeks. BP has been running low for sometime with 90's SBP  Review of Systems   Neurological:  Positive for dizziness.       WATER INTAKE:       250ml given orally prior to rehydration start, pt hasn't been drinkin much    POST FLUID VITALS:       Past Medical History:   Diagnosis Date    A-fib ([facility])     Colitis     Diverticulitis     Hyperlipidemia     Hypertension     Lung transplant recipient ([facility])     Bilateral lung transplant sec to interstitial lung disease    RA (rheumatoid arthritis) ([facility])     Shortness of breath     Sleep apnea     CPAP    Stroke ([facility])     Wears glasses

## 2024-02-08 NOTE — PATIENT INSTRUCTIONS
Follow up in 1 week  Hold metoprolol and entresto until follow up appointment next week  Will give 500 cc of NS  Continue current medications  Call with questions.

## 2024-02-09 NOTE — ASSESSMENT & PLAN NOTE
Regarding his heart failure with preserved ejection fraction he remains on appropriate medical therapy however is currently hypotensive this may be iatrogenic in nature as well as a combination of decreased fluid intake.  At this time we will stop his metoprolol therapy as well as his Entresto therapy to try and assess overall blood pressure response.  Will add back in appropriate medical therapy at his next office visit in 1 week and we will arrange for him to undergo normal saline infusion.

## 2024-02-09 NOTE — ASSESSMENT & PLAN NOTE
Regarding his paroxysmal atrial fibrillation he is currently undergone an ablation in 2023 and currently is in a sinus rhythm on his EKG.  I will defer management to the electrophysiology team at this time

## 2024-02-09 NOTE — PROGRESS NOTES
500 mL  NS administration complete at 1530 - BP 90/52  Patient tolerated well. Patient still lightheaded and foggy.    Per BD, administer remaining 500 mL NS    Remaining 500 mL NS infusion complete at 1605- BP 90/60  Patient tolerated well, states he feels a lot better.         PIV removed at 1630. Patient to follow up in one week per BD.    Discussed ER precautions, patient verbalized understanding.

## 2024-02-09 NOTE — PROGRESS NOTES
"[facility] Documentation:    Subjective:      Patient ID: Maximus Bender is an 68 y.o. male.    Chief Complaint:  Patient was seen in the cardiology office with Dr. Kadeem Worthy on February 8, 2024 found to be markedly hypotensive with concern for mild dehydration.  He is referred for normal saline infusion.  He has been experiencing dizziness over the last 24 hours    Review of Systems   Neurological:  Positive for dizziness.      Objective:   No results found for: \"NTPROBNP\"  Sodium   Date Value Ref Range Status   02/08/2024 139 135 - 145 mmol/L Final     Potassium   Date Value Ref Range Status   02/08/2024 4.5 3.6 - 5.5 mmol/L Final     Blood Urea Nitrogen   Date Value Ref Range Status   02/08/2024 37 (A) 8 - 22 mg/dL Final     Calcium   Date Value Ref Range Status   02/08/2024 9.5 8.5 - 10.5 mg/dL Final     Total Carbon Dioxide   Date Value Ref Range Status   02/08/2024 28 20 - 33 mmol/L Final     Chloride   Date Value Ref Range Status   02/08/2024 111 96 - 112 mmol/L Final     Creatinine   Date Value Ref Range Status   02/08/2024 1.3 0.5 - 1.4 mg/dL Final     Glucose   Date Value Ref Range Status   02/08/2024 117 (A) 65 - 99 mg/dL Final     eGFR   Date Value Ref Range Status   02/08/2024 56  Final       There were no vitals taken for this visit.    Physical Exam  Constitutional:       Appearance: Normal appearance.   Cardiovascular:      Rate and Rhythm: Normal rate.      Heart sounds: No murmur heard.     No friction rub. No gallop.   Musculoskeletal:      Right lower leg: No edema.      Left lower leg: No edema.   Neurological:      Mental Status: He is alert.        Assessment:     Diagnoses of Dehydration and Chronic heart failure with preserved ejection fraction (HCC) were pertinent to this visit.    Plan:     Noted dehydration and will proceed with normal saline 500 cc bolus.  And repeat bolus 500 cc if blood pressure is still less than 90 mmHg systolic.    Please see " clinic note for full details regarding medication reconciliation

## 2024-02-09 NOTE — ASSESSMENT & PLAN NOTE
Noted pulmonary vein isolation with subsequent Coumadin ridge atypical flutter ablation in 2023 currently being followed closely in the electrophysiology department.  No changes were made to his medical therapy at this time

## 2024-02-15 ENCOUNTER — OFFICE VISIT (OUTPATIENT)
Dept: CARDIOLOGY | Facility: MEDICAL CENTER | Age: 69
End: 2024-02-15
Attending: NURSE PRACTITIONER
Payer: MEDICARE

## 2024-02-15 VITALS
BODY MASS INDEX: 20 KG/M2 | DIASTOLIC BLOOD PRESSURE: 68 MMHG | OXYGEN SATURATION: 97 % | WEIGHT: 132 LBS | SYSTOLIC BLOOD PRESSURE: 114 MMHG | HEART RATE: 89 BPM | HEIGHT: 68 IN

## 2024-02-15 DIAGNOSIS — I50.32 CHRONIC HEART FAILURE WITH PRESERVED EJECTION FRACTION (HCC): ICD-10-CM

## 2024-02-15 DIAGNOSIS — E78.5 DYSLIPIDEMIA: ICD-10-CM

## 2024-02-15 DIAGNOSIS — I10 HTN (HYPERTENSION), BENIGN: ICD-10-CM

## 2024-02-15 PROCEDURE — 99213 OFFICE O/P EST LOW 20 MIN: CPT | Performed by: INTERNAL MEDICINE

## 2024-02-15 PROCEDURE — 3078F DIAST BP <80 MM HG: CPT | Performed by: INTERNAL MEDICINE

## 2024-02-15 PROCEDURE — 3074F SYST BP LT 130 MM HG: CPT | Performed by: INTERNAL MEDICINE

## 2024-02-15 ASSESSMENT — FIBROSIS 4 INDEX: FIB4 SCORE: 0.94

## 2024-02-16 NOTE — PROGRESS NOTES
[facility]    PatientName:Maximus HughesjosueDate: 2/15/2024  :1955    68 y.o.PCP:JULITO Almanza  MRN:[id number]        Problems and Plans    Dyslipidemia  Recommend continuing ongoing statin therapy.    Chronic heart failure with preserved ejection fraction (HCC)  Clinically, he is doing excellent remains on appropriate medical therapy for his heart failure with preserved ejection fraction.  He is able to tolerate his SGLT2 inhibitor and would not recommend any changes at this time or further additions to his medical therapy.  He is New [place] Heart Association class II    HTN (hypertension), benign  Blood pressures currently well-controlled on his current regimen no changes    Return in about 4 months (around 6/15/2024).      Encounter    Reason for Visit / Chief Complaint: Heart failure preserved ejection fraction    HPI    68-year-old male with known history of interstitial lung disease status post bilateral lung transplant in  at the [facility], heart failure with preserved ejection fraction, prior prostate cancer, hypertension, dyslipidemia, prior ablation for atrial fibrillation in  presents in clinic for ongoing management of his heart failure with preserved ejection fraction     Currently, he notes that he is feeling better and having improved energy.  He is not having dyspnea complaints and has been diligently taking his antirejection medications for his lung transplant.  He is accompanied by his wife who notes that his blood pressure has been paralleling the current readings in the office.  She notes he feels significantly better after stopping his metoprolol therapy as well as his Entresto therapy  Past Medical History  Past Medical History:   Diagnosis Date    A-fib (HCC)     Colitis     Diverticulitis     Hyperlipidemia     Hypertension     Lung transplant recipient (HCC)     Bilateral lung transplant sec to interstitial lung  disease    RA (rheumatoid arthritis) (HCC)     Shortness of breath     Sleep apnea     CPAP    Stroke ([facility])     Wears glasses      Past Surgical History  Past Surgical History:   Procedure Laterality Date    FUSION, SPINE, LUMBAR, PLIF  2016    Procedure: LUMBAR FUSION POSTERIOR  L2-4 ;  Surgeon: Olaf Rich M.D.;  Location: SURGERY [facility];  Service:      Social History  Social History     Socioeconomic History    Marital status:      Spouse name: Not on file    Number of children: Not on file    Years of education: Not on file    Highest education level: Not on file   Occupational History    Not on file   Tobacco Use    Smoking status: Former     Current packs/day: 0.00     Average packs/day: 0.5 packs/day for 26.0 years (13.0 ttl pk-yrs)     Types: Cigarettes     Start date:      Quit date:      Years since quittin.1    Smokeless tobacco: Never   Vaping Use    Vaping Use: Never used   Substance and Sexual Activity    Alcohol use: Not Currently    Drug use: Not Currently    Sexual activity: Not on file   Other Topics Concern    Not on file   Social History Narrative    Not on file     Social Determinants of Health     Financial Resource Strain: Not on file   Food Insecurity: Not on file   Transportation Needs: Not on file   Physical Activity: Not on file   Stress: Not on file   Social Connections: Not on file   Intimate Partner Violence: Not on file   Housing Stability: Not on file     Past Family History  No family history on file.  Medication(s)    Current Outpatient Medications:     MAGNESIUM PO, 840 mg, Oral, BID, Taking    LACTULOSE PO, 15 mg, Oral, BID, Taking    Nutritional Supplements (NEPRO PO), Take  by mouth every day., Taking    Polyethylene Glycol 3350 (MIRALAX PO), Take  by mouth as needed., PRN    MELATONIN PO, 5 mg, Oral, QHS, Taking    tacrolimus, Take 2.5 mg by mouth. 2.5 mg in the am and 2.5 mg in the pm, Taking    rivaroxaban, 20 mg, Oral, PM MEAL, Taking     "Empagliflozin, 12.5 mg, Oral, DAILY, Taking    rosuvastatin, 20 mg, Oral, Q EVENING, Taking    calcium carbonate, 1,000 mg, Oral, BID, Taking    vitamin D3, 1,000 Units, Oral, DAILY, Taking    multivitamin, 1 Tablet, Oral, DAILY, Taking    ondansetron, 8 mg, Oral, Q8HRS PRN, PRN    PANTOPRAZOLE SODIUM PO, Take  by mouth., Taking    Ferrous Sulfate (IRON PO), 325 mg, Oral, Q48HRS, Taking    mycophenolate, 500 mg, Oral, BID, Taking    predniSONE, Take 10 mg by mouth. PRN, Taking    ezetimibe, TK 1 T PO ONE TIME D, Taking    sulfamethoxazole-trimethoprim, 0.5 Tablet, Oral, Once, Taking  Allergies  Tape and Other drug    Review of Systems    A comprehensive 10 system review was conducted and is negative except as noted above in the HPI or here.      Vital Signs  /68 (BP Location: Left arm, Patient Position: Sitting, BP Cuff Size: Adult)   Pulse 89   Ht 1.727 m (5' 8\")   Wt 59.9 kg (132 lb)   SpO2 97%   BMI 20.07 kg/m²     Physical Exam  Constitutional:       Appearance: Normal appearance.   HENT:      Head: Normocephalic and atraumatic.      Mouth/Throat:      Mouth: Mucous membranes are moist.      Pharynx: Oropharynx is clear.   Eyes:      Extraocular Movements: Extraocular movements intact.      Conjunctiva/sclera: Conjunctivae normal.   Cardiovascular:      Rate and Rhythm: Normal rate and regular rhythm.      Pulses: Normal pulses.      Heart sounds: Normal heart sounds. No murmur heard.     No friction rub. No gallop.   Pulmonary:      Effort: Pulmonary effort is normal.      Breath sounds: Normal breath sounds.   Abdominal:      General: Bowel sounds are normal.      Palpations: Abdomen is soft.   Musculoskeletal:         General: Normal range of motion.      Cervical back: Normal range of motion and neck supple.   Skin:     General: Skin is warm and dry.   Neurological:      General: No focal deficit present.      Mental Status: He is alert and oriented to person, place, and time. Mental status is at " "baseline.   Psychiatric:         Mood and Affect: Mood normal.         Behavior: Behavior normal.         Thought Content: Thought content normal.         Judgment: Judgment normal.         No results found for: \"TSHULTRASEN\"   No results found for: \"FREET4\"     Lab Results   Component Value Date/Time    HBA1C 5.6 12/12/2022 04:59 AM     No results found for: \"CHOLSTRLTOT\", \"LDL\", \"HDL\", \"TRIGLYCERIDE\"      Lab Results   Component Value Date/Time    SODIUM 141 12/18/2023 07:00 AM    POTASSIUM 4.1 12/18/2023 07:00 AM    CHLORIDE 105 12/18/2023 07:00 AM    CO2 23 12/18/2023 07:00 AM    GLUCOSE 104 (H) 12/18/2023 07:00 AM    BUN 32 (H) 12/18/2023 07:00 AM    CREATININE 1.12 12/18/2023 07:00 AM    BUNCREATRAT 20 05/18/2021 07:31 AM    GLOMRATE 63 10/30/2023 02:23 PM       Lab Results   Component Value Date/Time    ALKPHOSPHAT 65 12/18/2023 07:00 AM    ASTSGOT 13 12/18/2023 07:00 AM    ALTSGPT 14 12/18/2023 07:00 AM    TBILIRUBIN 0.4 12/18/2023 07:00 AM             Total patient time was estimated to be 20 minutes consisting of chart review, direct patient interaction, medication renewal, plan development and overall communication with the cardiovascular team.        Electronically signed by:   Kadeem Worthy DO, MPH  [facility]    Portions of this note were completed using voice recognition software (Dragon Naturally speaking [employer]) . Occasional transcription errors may have escaped proof reading. I have made every reasonable attempt to correct obvious errors, but I expect that there are errors of grammar and possibly content that I did not discover before finalizing the note.      "

## 2024-02-16 NOTE — ASSESSMENT & PLAN NOTE
Clinically, he is doing excellent remains on appropriate medical therapy for his heart failure with preserved ejection fraction.  He is able to tolerate his SGLT2 inhibitor and would not recommend any changes at this time or further additions to his medical therapy.  He is New [place] Heart Association class II

## 2024-03-18 ENCOUNTER — OFFICE VISIT (OUTPATIENT)
Dept: CARDIOLOGY | Facility: MEDICAL CENTER | Age: 69
End: 2024-03-18
Attending: INTERNAL MEDICINE
Payer: MEDICARE

## 2024-03-18 VITALS
OXYGEN SATURATION: 100 % | RESPIRATION RATE: 14 BRPM | WEIGHT: 129 LBS | SYSTOLIC BLOOD PRESSURE: 122 MMHG | BODY MASS INDEX: 19.55 KG/M2 | DIASTOLIC BLOOD PRESSURE: 80 MMHG | HEART RATE: 61 BPM | HEIGHT: 68 IN

## 2024-03-18 DIAGNOSIS — Z98.890 S/P ABLATION OF ATRIAL FIBRILLATION: ICD-10-CM

## 2024-03-18 DIAGNOSIS — Z86.79 S/P ABLATION OF ATRIAL FIBRILLATION: ICD-10-CM

## 2024-03-18 DIAGNOSIS — I48.0 PAROXYSMAL ATRIAL FIBRILLATION (HCC): ICD-10-CM

## 2024-03-18 PROCEDURE — 93010 ELECTROCARDIOGRAM REPORT: CPT | Performed by: INTERNAL MEDICINE

## 2024-03-18 PROCEDURE — 99213 OFFICE O/P EST LOW 20 MIN: CPT | Mod: 25 | Performed by: INTERNAL MEDICINE

## 2024-03-18 PROCEDURE — 3074F SYST BP LT 130 MM HG: CPT | Performed by: INTERNAL MEDICINE

## 2024-03-18 PROCEDURE — 3079F DIAST BP 80-89 MM HG: CPT | Performed by: INTERNAL MEDICINE

## 2024-03-18 PROCEDURE — 93005 ELECTROCARDIOGRAM TRACING: CPT | Performed by: INTERNAL MEDICINE

## 2024-03-18 PROCEDURE — 99213 OFFICE O/P EST LOW 20 MIN: CPT | Performed by: INTERNAL MEDICINE

## 2024-03-18 ASSESSMENT — FIBROSIS 4 INDEX: FIB4 SCORE: 0.94

## 2024-03-18 NOTE — PROGRESS NOTES
Arrhythmia Clinic Note (Established patient)    DOS: 3/18/2024    Chief complaint/Reason for consult: F/u AF/AFL ablation    Interval History:  Pt is a 69 yo M. [facility]. History of bilateral lung transplants, RA, SUPA on CPAP, prior stroke, AF/AFL. S/p ablation for AF and L sided flutter. Successful. Has felt better since. He says more energy. No apparent recurrence. Remains in sinus rhythm on 12 lead today.    ROS (+ highlighted in red):  General--Negative for fatigue, weight loss or weight gain  Cardiovascular--Negative for CP, orthopnea, PND    Past Medical History:   Diagnosis Date    A-fib (HCC)     Colitis     Diverticulitis     Hyperlipidemia     Hypertension     Lung transplant recipient (HCC)     Bilateral lung transplant sec to interstitial lung disease    RA (rheumatoid arthritis) (HCC)     Shortness of breath     Sleep apnea     CPAP    Stroke (HCC)     Wears glasses        Past Surgical History:   Procedure Laterality Date    FUSION, SPINE, LUMBAR, PLIF  2016    Procedure: LUMBAR FUSION POSTERIOR  L2-4 ;  Surgeon: Olaf Rich M.D.;  Location: SURGERY [facility];  Service:        Social History     Socioeconomic History    Marital status:      Spouse name: Not on file    Number of children: Not on file    Years of education: Not on file    Highest education level: Not on file   Occupational History    Not on file   Tobacco Use    Smoking status: Former     Current packs/day: 0.00     Average packs/day: 0.5 packs/day for 26.0 years (13.0 ttl pk-yrs)     Types: Cigarettes     Start date:      Quit date:      Years since quittin.2    Smokeless tobacco: Never   Vaping Use    Vaping Use: Never used   Substance and Sexual Activity    Alcohol use: Not Currently    Drug use: Not Currently    Sexual activity: Not on file   Other Topics Concern    Not on file   Social History Narrative    Not on file     Social Determinants of Health     Financial Resource Strain:  Not on file   Food Insecurity: Not on file   Transportation Needs: Not on file   Physical Activity: Not on file   Stress: Not on file   Social Connections: Not on file   Intimate Partner Violence: Not on file   Housing Stability: Not on file       History reviewed. No pertinent family history.    Allergies   Allergen Reactions    Tape      RASH Severe    Other Drug      Other reaction(s): .Unknown  surgical tape       Current Outpatient Medications   Medication Sig Dispense Refill    MAGNESIUM PO Take 840 mg by mouth 2 times a day.      LACTULOSE PO Take 15 mg by mouth 2 times a day.      Nutritional Supplements (NEPRO PO) Take  by mouth every day.      Polyethylene Glycol 3350 (MIRALAX PO) Take  by mouth as needed.      MELATONIN PO Take 5 mg by mouth at bedtime.      tacrolimus (PROGRAF) 0.5 MG Cap Take 2.5 mg by mouth. 2.5 mg in the am and 2.5 mg in the pm      rivaroxaban (XARELTO) 20 MG Tab tablet Take 20 mg by mouth with dinner.      Empagliflozin 25 MG Tab Take 12.5 mg by mouth every day.      rosuvastatin (CRESTOR) 20 MG Tab Take 20 mg by mouth every evening.      calcium carbonate (TUMS) 500 MG Chew Tab Chew 1,000 mg 2 times a day.      vitamin D3 (CHOLECALCIFEROL) 1000 Unit (25 mcg) Tab Take 1,000 Units by mouth every day.      multivitamin Tab Take 1 Tablet by mouth every day.      ondansetron (ZOFRAN ODT) 8 MG TABLET DISPERSIBLE Take 8 mg by mouth every 8 hours as needed for Nausea.      PANTOPRAZOLE SODIUM PO Take  by mouth.      Ferrous Sulfate (IRON PO) Take 325 mg by mouth every 48 hours.      mycophenolate (CELLCEPT) 500 MG tablet Take 500 mg by mouth 2 times a day.      predniSONE (DELTASONE) 10 MG Tab Take 5 mg by mouth. PRN      ezetimibe (ZETIA) 10 MG Tab TK 1 T PO ONE TIME D      sulfamethoxazole-trimethoprim (BACTRIM DS) 800-160 MG tablet Take 0.5 Tablets by mouth one time. Take 1 tablet by mouth daily       No current facility-administered medications for this visit.       Physical  "Exam:  Vitals:    03/18/24 1016   BP: 122/80   BP Location: Left arm   Patient Position: Sitting   BP Cuff Size: Adult   Pulse: 61   Resp: 14   SpO2: 100%   Weight: 58.5 kg (129 lb)   Height: 1.727 m (5' 8\")     General appearance: NAD, conversant  HEENT: PERRL, neck is supple with FROM  Lungs: Clear to auscultation, normal respiratory effort  CV: RRR, no murmurs/rubs/gallops, no JVD  Abdomen: Soft, non-tender with normal bowel sounds  Extremities: No peripheral edema, no clubbing or cyanosis  Skin: No rash, lesions, or ulcers  Psych: Alert and oriented to person, place and time    Data:  Labs reviewed    Prior echo/stress reviewed:  Left Ventricle:   The left ventricle is normal in size. There is mild concentric left ventricular   hypertrophy. Left ventricular systolic function is low normal. The Ejection Fraction estimate is 50-   55%. There is septal wall mild hypokinesis. A variety of Doppler measurements indicate normal left   ventricular diastolic function.   Right Ventricle:   The right ventricle is normal in size, thickness and function.   Atria:   The left atrial volume index is estimated to be 34-41 ml/m2. The left atrium is mildly   dilated. The right atrium is normal in size. Subcostal imaging was suboptimal for evaluation of the   interatrial septum for shunting.   Mitral Valve:   The mitral valve leaflets are sclerotic and show some degree of functional   abnormality. There is no mitral valve stenosis. There is moderate mitral regurgitation.   Aortic Valve:   The aortic valve is trileaflet. The aortic valve opens well. There is no aortic   valve stenosis. No aortic regurgitation is present.   Tricuspid Valve:   The tricuspid valve leaflets are thin and pliable. There is trace tricuspid   regurgitation.   Pulmonic Valve:   The pulmonic valve is not well seen, but is grossly normal. There is a trace or   physiologic amount of pulmonic regurgitation.   Great Vessels:   The aortic root is normal size. The " ascending aorta measures 29 mm in size. The   inferior vena cava was not well visualized. Subcostal imaging was suboptimal for evaluation of the   IVC. A value of 3 mmHg was used to estimate right atrial pressure.   Pericardium/Pleural:   There is no pericardial effusion. There is no pleural effusion.     EKG interpreted by me:  Sinus, intermittent PVC    Impression/Plan:  1. Paroxysmal atrial fibrillation (HCC)  EKG      2. S/P ablation of atrial fibrillation          -Had L sided persistent flutter  -Done well since flutter ablation and PV isolation  -Has f/u with Dr. Worthy  -Can f/u w me in 12 mo  Ajay Lee MD

## 2024-03-30 LAB — EKG IMPRESSION: NORMAL

## 2024-06-27 ENCOUNTER — OFFICE VISIT (OUTPATIENT)
Dept: CARDIOLOGY | Facility: MEDICAL CENTER | Age: 69
End: 2024-06-27
Attending: INTERNAL MEDICINE
Payer: MEDICARE

## 2024-06-27 VITALS
OXYGEN SATURATION: 97 % | BODY MASS INDEX: 18.34 KG/M2 | WEIGHT: 121 LBS | SYSTOLIC BLOOD PRESSURE: 100 MMHG | HEIGHT: 68 IN | DIASTOLIC BLOOD PRESSURE: 60 MMHG | HEART RATE: 79 BPM | RESPIRATION RATE: 16 BRPM

## 2024-06-27 DIAGNOSIS — Z98.890 S/P ABLATION OF ATRIAL FIBRILLATION: ICD-10-CM

## 2024-06-27 DIAGNOSIS — Z86.79 S/P ABLATION OF ATRIAL FIBRILLATION: ICD-10-CM

## 2024-06-27 DIAGNOSIS — I48.0 PAROXYSMAL ATRIAL FIBRILLATION (HCC): ICD-10-CM

## 2024-06-27 DIAGNOSIS — I50.32 CHRONIC HEART FAILURE WITH PRESERVED EJECTION FRACTION (HCC): ICD-10-CM

## 2024-06-27 DIAGNOSIS — I10 HTN (HYPERTENSION), BENIGN: ICD-10-CM

## 2024-06-27 DIAGNOSIS — E78.5 DYSLIPIDEMIA: ICD-10-CM

## 2024-06-27 LAB — EKG IMPRESSION: NORMAL

## 2024-06-27 PROCEDURE — 93005 ELECTROCARDIOGRAM TRACING: CPT | Performed by: INTERNAL MEDICINE

## 2024-06-27 PROCEDURE — 99213 OFFICE O/P EST LOW 20 MIN: CPT | Performed by: INTERNAL MEDICINE

## 2024-06-27 ASSESSMENT — FIBROSIS 4 INDEX: FIB4 SCORE: 1.17

## 2024-06-27 NOTE — ASSESSMENT & PLAN NOTE
Blood pressures currently well-controlled without additional antihypertensive agents.  At this time no changes were made to his medical therapy

## 2024-06-27 NOTE — PROGRESS NOTES
[facility]    PatientName:Maximus HughesjosueDate: 2024  :1955    68 y.o.PCP:JULITO Almanza  MRN:[id number]        Problems and Plans    Chronic heart failure with preserved ejection fraction (HCC)  Clinically, he is doing well and is tolerating his SGLT2 inhibitor consisting of Jardiance at 12.5 mg daily.  I would not recommend changing in any of his current medications at this time.  Blood pressure is currently stable    S/P ablation of atrial fibrillation  Noted pulmonary vein isolation with subsequent Coumadin ridge atypical flutter ablation in  currently being followed closely in the electrophysiology department.  No changes were made to his medical therapy at this time.  EKG does not demonstrate any evidence of atrial fibrillation.    Dyslipidemia  Recommend continuing ongoing statin therapy as well as ongoing Zetia therapy.    Paroxysmal atrial fibrillation (HCC)  Regarding his paroxysmal atrial fibrillation he is currently undergone an ablation in  and currently is in a sinus rhythm on his EKG.  I will defer management to the electrophysiology team at this time    HTN (hypertension), benign  Blood pressures currently well-controlled without additional antihypertensive agents.  At this time no changes were made to his medical therapy    Return in about 1 year (around 2025).      Encounter    Reason for Visit / Chief Complaint: Heart failure preserved ejection fraction    HPI    68-year-old male with known history of interstitial lung disease status post bilateral lung transplant in  at the [facility], heart failure with preserved ejection fraction, prior prostate cancer, hypertension, dyslipidemia, prior ablation for atrial fibrillation in  presents in clinic for ongoing management of his heart failure with preserved ejection fraction    Currently, he notes he is feeling well and not having any adverse  cardiovascular complaints.  He continues to recuperate his strength and feels significantly improved and has noted that his blood pressure has significantly improved after changes from his last office visit.    He is accompanied by his wife Isidro, she notes that he has more energy and feels more engaged..      Past Medical History  Past Medical History:   Diagnosis Date    A-fib ([facility])     Colitis     Diverticulitis     Hyperlipidemia     Hypertension     Lung transplant recipient ([facility])     Bilateral lung transplant sec to interstitial lung disease    RA (rheumatoid arthritis) ([facility])     Shortness of breath     Sleep apnea     CPAP    Stroke ([facility])     Wears glasses      Past Surgical History  Past Surgical History:   Procedure Laterality Date    FUSION, SPINE, LUMBAR, PLIF  2016    Procedure: LUMBAR FUSION POSTERIOR  L2-4 ;  Surgeon: Olaf Rich M.D.;  Location: SURGERY [facility];  Service:      Social History  Social History     Socioeconomic History    Marital status:      Spouse name: Not on file    Number of children: Not on file    Years of education: Not on file    Highest education level: Not on file   Occupational History    Not on file   Tobacco Use    Smoking status: Former     Current packs/day: 0.00     Average packs/day: 0.5 packs/day for 26.0 years (13.0 ttl pk-yrs)     Types: Cigarettes     Start date:      Quit date:      Years since quittin.5    Smokeless tobacco: Never   Vaping Use    Vaping status: Never Used   Substance and Sexual Activity    Alcohol use: Not Currently    Drug use: Not Currently    Sexual activity: Not on file   Other Topics Concern    Not on file   Social History Narrative    Not on file     Social Determinants of Health     Financial Resource Strain: Not on file   Food Insecurity: Not on file   Transportation Needs: Not on file   Physical Activity: Not on file   Stress: Not on file   Social Connections: Not on file   Intimate Partner Violence: Low  "Risk  (8/10/2023)    Received from [facility]    History of Abuse     History of Abuse: Denies   Housing Stability: Not on file     Past Family History  No family history on file.  Medication(s)    Current Outpatient Medications:     MAGNESIUM PO, 840 mg, Oral, BID, Taking    LACTULOSE PO, 15 mg, Oral, BID, Taking    Nutritional Supplements (NEPRO PO), Take  by mouth every day., Taking    Polyethylene Glycol 3350 (MIRALAX PO), Take  by mouth as needed., PRN    MELATONIN PO, 5 mg, Oral, QHS, Taking    tacrolimus, Take 2.5 mg by mouth. 2.5 mg in the am and 2.5 mg in the pm, Taking    rivaroxaban, 20 mg, Oral, PM MEAL, Taking    Empagliflozin, 12.5 mg, Oral, DAILY, Taking    rosuvastatin, 20 mg, Oral, Q EVENING, Taking    calcium carbonate, 1,000 mg, Oral, BID, Taking    vitamin D3, 1,000 Units, Oral, DAILY, Taking    multivitamin, 1 Tablet, Oral, DAILY, Taking    ondansetron, 8 mg, Oral, Q8HRS PRN, PRN    PANTOPRAZOLE SODIUM PO, Take  by mouth., Taking    Ferrous Sulfate (IRON PO), 325 mg, Oral, Q48HRS, Taking    mycophenolate, 500 mg, Oral, BID, Taking    predniSONE, Take 5 mg by mouth. PRN, Taking    ezetimibe, TK 1 T PO ONE TIME D, Taking    sulfamethoxazole-trimethoprim, 0.5 Tablet, Oral, Once, Taking  Allergies  Tape and Other drug    Review of Systems    A comprehensive 10 system review was conducted and is negative except as noted above in the HPI or here.      Vital Signs  /60 (BP Location: Left arm, Patient Position: Sitting, BP Cuff Size: Adult)   Pulse 79   Resp 16   Ht 1.727 m (5' 8\")   Wt 54.9 kg (121 lb)   SpO2 97%   BMI 18.40 kg/m²     Physical Exam  Constitutional:       Appearance: Normal appearance. He is obese.   HENT:      Head: Normocephalic and atraumatic.      Mouth/Throat:      Mouth: Mucous membranes are moist.      Pharynx: Oropharynx is clear.   Eyes:      Extraocular Movements: Extraocular movements intact.      Conjunctiva/sclera: Conjunctivae normal. " "  Cardiovascular:      Rate and Rhythm: Normal rate and regular rhythm.      Pulses: Normal pulses.      Heart sounds: Normal heart sounds. No murmur heard.     No friction rub. No gallop.   Pulmonary:      Effort: Pulmonary effort is normal.      Breath sounds: Normal breath sounds.   Abdominal:      General: Bowel sounds are normal.      Palpations: Abdomen is soft.   Musculoskeletal:         General: Normal range of motion.      Cervical back: Normal range of motion and neck supple.   Skin:     General: Skin is warm and dry.   Neurological:      General: No focal deficit present.      Mental Status: He is alert and oriented to person, place, and time. Mental status is at baseline.   Psychiatric:         Mood and Affect: Mood normal.         Behavior: Behavior normal.         Thought Content: Thought content normal.         Judgment: Judgment normal.         No results found for: \"TSHULTRASEN\"   No results found for: \"FREET4\"     Lab Results   Component Value Date/Time    HBA1C 5.6 12/12/2022 04:59 AM     No results found for: \"CHOLSTRLTOT\", \"LDL\", \"HDL\", \"TRIGLYCERIDE\"      Lab Results   Component Value Date/Time    SODIUM 141 12/18/2023 07:00 AM    POTASSIUM 4.1 12/18/2023 07:00 AM    CHLORIDE 105 12/18/2023 07:00 AM    CO2 23 12/18/2023 07:00 AM    GLUCOSE 104 (H) 12/18/2023 07:00 AM    BUN 32 (H) 12/18/2023 07:00 AM    CREATININE 1.12 12/18/2023 07:00 AM    BUNCREATRAT 20 05/18/2021 07:31 AM    GLOMRATE 63 10/30/2023 02:23 PM       Lab Results   Component Value Date/Time    ALKPHOSPHAT 65 12/18/2023 07:00 AM    ASTSGOT 13 12/18/2023 07:00 AM    ALTSGPT 14 12/18/2023 07:00 AM    TBILIRUBIN 0.4 12/18/2023 07:00 AM         Imaging  Sinus rhythm.  I reviewed service EKG.  Findings are discussed with the patient    Echocardiogram  No results found for this or any previous visit.    None      Total patient time was estimated to be 30 minutes consisting of chart review, direct patient interaction, medication renewal, " plan development and overall communication with the cardiovascular team.        Electronically signed by:   Kadeem Worthy DO, MPH  [facility]    Portions of this note were completed using voice recognition software (Dragon Naturally speaking software) . Occasional transcription errors may have escaped proof reading. I have made every reasonable attempt to correct obvious errors, but I expect that there are errors of grammar and possibly content that I did not discover before finalizing the note.

## 2024-06-27 NOTE — ASSESSMENT & PLAN NOTE
Noted pulmonary vein isolation with subsequent Coumadin ridge atypical flutter ablation in 2023 currently being followed closely in the electrophysiology department.  No changes were made to his medical therapy at this time.  EKG does not demonstrate any evidence of atrial fibrillation.

## 2024-07-19 ENCOUNTER — TELEPHONE (OUTPATIENT)
Dept: CARDIOLOGY | Facility: MEDICAL CENTER | Age: 69
End: 2024-07-19
Payer: MEDICARE

## 2024-11-27 ENCOUNTER — HOSPITAL ENCOUNTER (OUTPATIENT)
Dept: RADIOLOGY | Facility: MEDICAL CENTER | Age: 69
End: 2024-11-27
Attending: PHYSICIAN ASSISTANT

## 2024-11-27 ENCOUNTER — HOSPITAL ENCOUNTER (OUTPATIENT)
Dept: RADIOLOGY | Facility: MEDICAL CENTER | Age: 69
End: 2024-11-27
Attending: INTERNAL MEDICINE

## 2024-11-27 ENCOUNTER — OFFICE VISIT (OUTPATIENT)
Dept: SLEEP MEDICINE | Facility: MEDICAL CENTER | Age: 69
End: 2024-11-27
Attending: INTERNAL MEDICINE
Payer: MEDICARE

## 2024-11-27 VITALS
WEIGHT: 116 LBS | RESPIRATION RATE: 17 BRPM | BODY MASS INDEX: 17.58 KG/M2 | OXYGEN SATURATION: 97 % | SYSTOLIC BLOOD PRESSURE: 100 MMHG | DIASTOLIC BLOOD PRESSURE: 60 MMHG | HEIGHT: 68 IN | HEART RATE: 74 BPM

## 2024-11-27 DIAGNOSIS — I63.9 CEREBROVASCULAR ACCIDENT (CVA), UNSPECIFIED MECHANISM (HCC): ICD-10-CM

## 2024-11-27 DIAGNOSIS — R93.89 ABNORMAL CT SCAN: ICD-10-CM

## 2024-11-27 DIAGNOSIS — Z94.2 LUNG TRANSPLANT STATUS, BILATERAL (HCC): ICD-10-CM

## 2024-11-27 DIAGNOSIS — M06.9 RHEUMATOID ARTHRITIS, INVOLVING UNSPECIFIED SITE, UNSPECIFIED WHETHER RHEUMATOID FACTOR PRESENT (HCC): ICD-10-CM

## 2024-11-27 PROCEDURE — 99213 OFFICE O/P EST LOW 20 MIN: CPT | Performed by: INTERNAL MEDICINE

## 2024-11-27 RX ORDER — TACROLIMUS 0.5 MG/1
0.5 CAPSULE ORAL
COMMUNITY
Start: 2024-10-22

## 2024-11-27 ASSESSMENT — ENCOUNTER SYMPTOMS
CHILLS: 0
PND: 0
VOMITING: 0
HEADACHES: 0
FOCAL WEAKNESS: 0
FALLS: 0
WEAKNESS: 0
WEIGHT LOSS: 1
PHOTOPHOBIA: 0
ORTHOPNEA: 0
HEARTBURN: 0
HEMOPTYSIS: 0
STRIDOR: 0
COUGH: 1
PALPITATIONS: 0
BACK PAIN: 0
DOUBLE VISION: 0
EYE DISCHARGE: 0
FEVER: 0
DIZZINESS: 0
NECK PAIN: 0
CONSTIPATION: 0
TREMORS: 0
EYE PAIN: 0
DIARRHEA: 0
DEPRESSION: 0
NAUSEA: 0
BLURRED VISION: 0
EYE REDNESS: 0
SPEECH CHANGE: 0
WHEEZING: 0
CLAUDICATION: 0
SHORTNESS OF BREATH: 0
ABDOMINAL PAIN: 0
MYALGIAS: 0
DIAPHORESIS: 0
SPUTUM PRODUCTION: 0
SINUS PAIN: 0
SORE THROAT: 0

## 2024-11-27 ASSESSMENT — FIBROSIS 4 INDEX: FIB4 SCORE: 1.19

## 2024-11-27 NOTE — PROGRESS NOTES
Chief Complaint   Patient presents with    Establish Care     REF BY LORIN ACOSTA FOR  Abnormal findings on diagnostic imaging, LAST SEEN [place]. ABIGAIL ON 2/8/22, CT CHEST 11/26/24,11/12/24, 5/10/24, CXR 11/12/24         HPI: This patient is a 69 y.o. male with a hx of connective tissue disease associated interstitial lung disease status post bilateral lung transplant at the [facility] in [place] in December 2022.  This was complicated by postoperative atrial fibrillation and cerebrovascular accident.  He is now followed by electrophysiology here and status post ablation.  He is followed by rheumatology in [facility].  He is a former tobacco smoker with an estimated 13-pack-year history but has been tobacco free since 1997.  [facility]  He had routine CT imaging on November 12 which showed a new 1.5 mm nodular density at the base of the right lower lobe compared to imaging from May of this year.  He was referred to us for navigational bronchoscopy given concern for infection versus rejection.  For unclear reasons to me a repeat CT chest was done on November 26 which showed complete resolution of the prior nodular infiltrate however he has what looks like mucous plugging in a different location of the right lower lobe suggesting aspiration.  The patient does note that he has coughing during meals particular when drinking thin liquids.  He has not had formal sleep evaluation per wife since his cerebrovascular accident.  More recently he has been dealing with gastrointestinal issues.  He has lost significant weight since I saw him in 2021 pretransplant.  He was 180 pounds at that time today in clinic is 116 pounds.  He denies shortness of breath.  Does note coughing with mealtimes as per above.  He is obviously on immunosuppression.      Past Medical History:   Diagnosis Date    A-fib (HCC)     Colitis     Diverticulitis     Hyperlipidemia      Hypertension     Lung transplant recipient (HCC)     Bilateral lung transplant sec to interstitial lung disease    RA (rheumatoid arthritis) (HCC)     Shortness of breath     Sleep apnea     CPAP    Stroke ([facility])     Wears glasses        Social History     Socioeconomic History    Marital status:      Spouse name: Not on file    Number of children: Not on file    Years of education: Not on file    Highest education level: Not on file   Occupational History    Not on file   Tobacco Use    Smoking status: Former     Current packs/day: 0.00     Average packs/day: 0.5 packs/day for 26.0 years (13.0 ttl pk-yrs)     Types: Cigarettes     Start date:      Quit date:      Years since quittin.9    Smokeless tobacco: Never   Vaping Use    Vaping status: Never Used   Substance and Sexual Activity    Alcohol use: Not Currently    Drug use: Not Currently    Sexual activity: Not on file   Other Topics Concern    Not on file   Social History Narrative    Not on file     Social Drivers of Health     Financial Resource Strain: Not on file   Food Insecurity: Not on file   Transportation Needs: Not on file   Physical Activity: Not on file   Stress: Not on file   Social Connections: Not on file   Intimate Partner Violence: Low Risk  (8/10/2023)    [facility]    History of Abuse     History of Abuse: Denies   Housing Stability: Not on file       No family history on file.    Current Outpatient Medications on File Prior to Visit   Medication Sig Dispense Refill    tacrolimus (PROGRAF) 0.5 MG Cap Take 0.5 mg by mouth.      MAGNESIUM PO Take 840 mg by mouth 2 times a day.      Nutritional Supplements (NEPRO PO) Take  by mouth every day.      MELATONIN PO Take 5 mg by mouth at bedtime.      rivaroxaban (XARELTO) 20 MG Tab tablet Take 20 mg by mouth with dinner.      Empagliflozin 25 MG Tab Take 12.5 mg by mouth every day.      calcium carbonate (TUMS) 500 MG Chew  Tab Chew 1,000 mg 2 times a day.      vitamin D3 (CHOLECALCIFEROL) 1000 Unit (25 mcg) Tab Take 1,000 Units by mouth every day.      multivitamin Tab Take 1 Tablet by mouth every day.      ondansetron (ZOFRAN ODT) 8 MG TABLET DISPERSIBLE Take 8 mg by mouth every 8 hours as needed for Nausea.      PANTOPRAZOLE SODIUM PO Take  by mouth.      Ferrous Sulfate (IRON PO) Take 325 mg by mouth every 48 hours.      mycophenolate (CELLCEPT) 500 MG tablet Take 500 mg by mouth 2 times a day.      predniSONE (DELTASONE) 10 MG Tab Take 5 mg by mouth. PRN      ezetimibe (ZETIA) 10 MG Tab TK 1 T PO ONE TIME D      sulfamethoxazole-trimethoprim (BACTRIM DS) 800-160 MG tablet Take 0.5 Tablets by mouth one time. Take 1 tablet by mouth daily      LACTULOSE PO Take 15 mg by mouth 2 times a day. (Patient not taking: Reported on 11/27/2024)      Polyethylene Glycol 3350 (MIRALAX PO) Take  by mouth as needed.      rosuvastatin (CRESTOR) 20 MG Tab Take 20 mg by mouth every evening.       No current facility-administered medications on file prior to visit.       Tape and Other drug      ROS:   Review of Systems   Constitutional:  Positive for weight loss. Negative for chills, diaphoresis, fever and malaise/fatigue.   HENT:  Negative for congestion, ear discharge, ear pain, hearing loss, nosebleeds, sinus pain, sore throat and tinnitus.    Eyes:  Negative for blurred vision, double vision, photophobia, pain, discharge and redness.   Respiratory:  Positive for cough. Negative for hemoptysis, sputum production, shortness of breath, wheezing and stridor.    Cardiovascular:  Negative for chest pain, palpitations, orthopnea, claudication, leg swelling and PND.   Gastrointestinal:  Negative for abdominal pain, constipation, diarrhea, heartburn, nausea and vomiting.   Genitourinary:  Negative for dysuria and urgency.   Musculoskeletal:  Negative for back pain, falls, joint pain, myalgias and neck pain.   Skin:  Negative for itching and rash.  "  Neurological:  Negative for dizziness, tremors, speech change, focal weakness, weakness and headaches.   Endo/Heme/Allergies:  Negative for environmental allergies.   Psychiatric/Behavioral:  Negative for depression.        /60 (BP Location: Right arm, Patient Position: Sitting, BP Cuff Size: Adult)   Pulse 74   Resp 17   Ht 1.727 m (5' 8\")   Wt 52.6 kg (116 lb)   SpO2 97%   Physical Exam  Vitals reviewed.   Constitutional:       General: He is not in acute distress.     Appearance: Normal appearance.   HENT:      Head: Normocephalic and atraumatic.      Right Ear: External ear normal.      Left Ear: External ear normal.      Nose: Nose normal. No congestion.      Mouth/Throat:      Mouth: Mucous membranes are moist.      Pharynx: Oropharynx is clear. No oropharyngeal exudate.   Eyes:      General: No scleral icterus.     Extraocular Movements: Extraocular movements intact.      Conjunctiva/sclera: Conjunctivae normal.      Pupils: Pupils are equal, round, and reactive to light.   Cardiovascular:      Rate and Rhythm: Normal rate and regular rhythm.      Heart sounds: Normal heart sounds. No murmur heard.     No gallop.   Pulmonary:      Effort: No respiratory distress.      Breath sounds: Normal breath sounds. No wheezing or rales.   Abdominal:      Palpations: Abdomen is soft.   Musculoskeletal:         General: Normal range of motion.      Cervical back: Normal range of motion and neck supple.      Right lower leg: No edema.      Left lower leg: No edema.   Skin:     General: Skin is warm and dry.      Findings: No rash.   Neurological:      General: No focal deficit present.      Mental Status: He is alert and oriented to person, place, and time.      Cranial Nerves: No cranial nerve deficit.   Psychiatric:         Mood and Affect: Mood normal.         Behavior: Behavior normal.         PFTs as reviewed by me personally: No postop PFT    Imagaing as reviewed by me personally: As per " HPI    Assessment:  1. Abnormal CT scan  Referral to Other      2. Rheumatoid arthritis, involving unspecified site, unspecified whether rheumatoid factor present (HCC)        3. Lung transplant status, bilateral (HCC)        4. Cerebrovascular accident (CVA), unspecified mechanism (HCC)  Referral to Other          Plan:  Nodular density noted on CT from November 12 has resolved on November 26 suggesting inflammatory or most likely aspiration given location and new or findings suggesting mucous plugging on the repeat CT.  At this point I am recommending referral to speech [facility] which [facility] or outside given cough with meals and history of CVA.  I do not think bronchoscopy is indicated at this time but we are happy to see him back if his transplant team or the [facility] feels otherwise.  Followed by rheumatology.  On immunosuppression.  [facility] at [facility] and [facility] transplant team.  As per above, this does put him at risk for aspiration and I am recommending referral to speech therapy.  Return if symptoms worsen or fail to improve.

## 2025-03-24 ENCOUNTER — APPOINTMENT (OUTPATIENT)
Dept: CARDIOLOGY | Facility: MEDICAL CENTER | Age: 70
End: 2025-03-24
Attending: INTERNAL MEDICINE
Payer: MEDICARE

## 2025-04-10 PROBLEM — C61 CARCINOMA OF PROSTATE (HCC): Status: ACTIVE | Noted: 2025-04-10

## 2025-04-10 PROBLEM — K59.00 CONSTIPATION: Status: ACTIVE | Noted: 2025-04-10

## 2025-04-10 PROBLEM — Z79.01 LONG TERM (CURRENT) USE OF ANTICOAGULANTS: Status: ACTIVE | Noted: 2025-04-10

## 2025-04-10 PROBLEM — M81.0 AGE-RELATED OSTEOPOROSIS WITHOUT CURRENT PATHOLOGICAL FRACTURE: Status: ACTIVE | Noted: 2025-04-10

## 2025-04-10 PROBLEM — Z94.2: Status: ACTIVE | Noted: 2025-04-10

## 2025-04-10 PROBLEM — N18.2 CHRONIC KIDNEY DISEASE, STAGE 2 (MILD): Status: ACTIVE | Noted: 2025-04-10

## 2025-04-10 PROBLEM — I63.9 ACUTE CVA (CEREBROVASCULAR ACCIDENT) (HCC): Status: ACTIVE | Noted: 2025-02-05

## 2025-04-10 PROBLEM — L82.0 INFLAMED SEBORRHEIC KERATOSIS: Status: ACTIVE | Noted: 2025-04-10

## 2025-04-10 PROBLEM — K21.00 GASTROESOPHAGEAL REFLUX DISEASE WITH ESOPHAGITIS: Status: ACTIVE | Noted: 2022-07-15

## 2025-06-05 ENCOUNTER — TELEPHONE (OUTPATIENT)
Dept: CARDIOLOGY | Facility: MEDICAL CENTER | Age: 70
End: 2025-06-05
Payer: MEDICARE

## 2025-06-20 ENCOUNTER — OFFICE VISIT (OUTPATIENT)
Dept: CARDIOLOGY | Facility: MEDICAL CENTER | Age: 70
End: 2025-06-20
Attending: INTERNAL MEDICINE
Payer: MEDICARE

## 2025-06-20 VITALS
DIASTOLIC BLOOD PRESSURE: 52 MMHG | OXYGEN SATURATION: 95 % | WEIGHT: 130 LBS | RESPIRATION RATE: 14 BRPM | BODY MASS INDEX: 19.7 KG/M2 | HEART RATE: 75 BPM | SYSTOLIC BLOOD PRESSURE: 96 MMHG | HEIGHT: 68 IN

## 2025-06-20 DIAGNOSIS — I10 HTN (HYPERTENSION), BENIGN: ICD-10-CM

## 2025-06-20 DIAGNOSIS — I63.9 ACUTE CVA (CEREBROVASCULAR ACCIDENT) (HCC): ICD-10-CM

## 2025-06-20 DIAGNOSIS — Z86.79 S/P ABLATION OF ATRIAL FIBRILLATION: ICD-10-CM

## 2025-06-20 DIAGNOSIS — I48.0 PAROXYSMAL ATRIAL FIBRILLATION (HCC): Primary | ICD-10-CM

## 2025-06-20 DIAGNOSIS — Z98.890 S/P ABLATION OF ATRIAL FIBRILLATION: ICD-10-CM

## 2025-06-20 LAB — EKG IMPRESSION: NORMAL

## 2025-06-20 PROCEDURE — 99212 OFFICE O/P EST SF 10 MIN: CPT | Performed by: INTERNAL MEDICINE

## 2025-06-20 PROCEDURE — 3078F DIAST BP <80 MM HG: CPT | Performed by: INTERNAL MEDICINE

## 2025-06-20 PROCEDURE — 93005 ELECTROCARDIOGRAM TRACING: CPT | Mod: TC | Performed by: INTERNAL MEDICINE

## 2025-06-20 PROCEDURE — 3074F SYST BP LT 130 MM HG: CPT | Performed by: INTERNAL MEDICINE

## 2025-06-20 PROCEDURE — 99214 OFFICE O/P EST MOD 30 MIN: CPT | Performed by: INTERNAL MEDICINE

## 2025-06-20 ASSESSMENT — FIBROSIS 4 INDEX: FIB4 SCORE: 1.3

## 2025-06-20 NOTE — PROGRESS NOTES
Arrhythmia Clinic Note (Established patient)    DOS: 2025    Chief complaint/Reason for consult: F/u AFL    Interval History:  Pt is a 70 yo M. History of atypical L sided flutter with re-entry around the L sided PVs. Underwent PV isolation and ablation of atypical flutter. No apparent recurrence of flutter or fib on surveillance EKGs. Was compliant with his xarelto. Nonetheless suffered a stroke in feb. Presented to [facility]. There he was switched to pradaxa for xarelto. Still with residual R sided weakness and difficulty word finding.    ROS (+ highlighted in red):  General--Negative for fatigue, weight loss or weight gain  Cardiovascular--Negative for CP, orthopnea, PND    Past Medical History[1]    Past Surgical History[2]    Social History     Socioeconomic History    Marital status:      Spouse name: Not on file    Number of children: Not on file    Years of education: Not on file    Highest education level: Not on file   Occupational History    Not on file   Tobacco Use    Smoking status: Former     Current packs/day: 0.00     Average packs/day: 0.5 packs/day for 26.0 years (13.0 ttl pk-yrs)     Types: Cigarettes     Start date:      Quit date:      Years since quittin.4    Smokeless tobacco: Never   Vaping Use    Vaping status: Never Used   Substance and Sexual Activity    Alcohol use: Not Currently    Drug use: Not Currently    Sexual activity: Not on file   Other Topics Concern    Not on file   Social History Narrative    Not on file     Social Drivers of Health     Financial Resource Strain: Not on file   Food Insecurity: Low Risk  (2025)    Received from [facility]    [facility] Food Insecurity     In the last month, did you feel there was not enough money for food?: No   Transportation Needs: Low Risk  (2025)    Received from [facility]    [facility] Transportation Needs     In the last month, have you not seen a doctor because you didn't have a way to  "get to the clinic or hospital?: No   Physical Activity: Not on file   Stress: Not on file   Social Connections: Not on file   Intimate Partner Violence: Low Risk  (8/10/2023)    Received from [facility]    History of Abuse     History of Abuse: Denies   Housing Stability: Low Risk  (2/5/2025)    Received from [facility]    [facility] Housing Needs     In the last month, was there a time when you were not able to pay your mortgage or rent?: No     In the last month, have you slept outside, in a shelter, in a car, or any place not meant for sleeping?: Not on file       Family History   Problem Relation Age of Onset    No Known Problems Son     Alcohol abuse Son        Allergies[3]    Current Medications[4]    Physical Exam:  Vitals:    06/20/25 1553   BP: 96/52   BP Location: Left arm   Patient Position: Sitting   BP Cuff Size: Adult   Pulse: 75   Resp: 14   SpO2: 95%   Weight: 59 kg (130 lb)   Height: 1.727 m (5' 8\")     General appearance: NAD, conversant  HEENT: PERRL, neck is supple with FROM  Lungs: Clear to auscultation, normal respiratory effort  CV: RRR, no murmurs/rubs/gallops, no JVD  Abdomen: Soft, non-tender with normal bowel sounds  Extremities: No peripheral edema, no clubbing or cyanosis  Skin: No rash, lesions, or ulcers  Psych: Alert and oriented to person, place and time    Data:  Labs reviewed    Prior echo/stress reviewed:  LVEF 35-40%    EKG interpreted by me:  NSR    Impression/Plan:  1. Paroxysmal atrial fibrillation (HCC)  EKG      2. HTN (hypertension), benign        3. S/P ablation of atrial fibrillation        4. Acute CVA (cerebrovascular accident) (HCC)          -I am not certain why pradaxa over xarelto as he does have mild iron def anemia, if switching eliquis has lower overall bleeding risk  -That being said they are tolerating paradaxa thus far will continue  -Remains in sinus on surveillance  -F/u in 12 mo    Ajay Lee MD         [1]   Past Medical " History:  Diagnosis Date    A-fib ([facility])     Colitis     Diverticulitis     Hyperlipidemia     Hypertension     Lung transplant recipient (HCC)     Bilateral lung transplant sec to interstitial lung disease    RA (rheumatoid arthritis) ([facility])     Shortness of breath     Sleep apnea     CPAP    Stroke ([facility])     Wears glasses    [2]   Past Surgical History:  Procedure Laterality Date    FUSION, SPINE, LUMBAR, PLIF  11/01/2016    Procedure: LUMBAR FUSION POSTERIOR  L2-4 ;  Surgeon: Olaf Rich M.D.;  Location: SURGERY [facility];  Service:     OTHER      Lung transplant    OTHER      prostate surgery   [3]   Allergies  Allergen Reactions    Tape      RASH Severe    Other Drug      Other reaction(s): .Unknown  surgical tape   [4]   Current Outpatient Medications   Medication Sig Dispense Refill    zoledronic Acid (RECLAST) 5 MG/100ML Solution IVPB premix 5 mg intravenously once annually for 1 day      Insulin Pen Needle (B-D UF III MINI PEN NEEDLES) 31G X 5 MM Misc 1 Each every day. For use with teriparatide pen 90 Each 3    FORTEO 560 MCG/2.24ML Solution Pen-injector Inject 20 mcg under the skin every day. 2.24 mL 11    dabigatran (PRADAXA) 150 MG Cap capsule Take 150 mg by mouth 2 times a day.      lidocaine (LIDODERM) 5 % Patch Place  on the skin.      ketoconazole (NIZORAL) 2 % shampoo Apply  topically.      fluocinolone acetonide (SYNALAR) 0.01 % external solution Apply  topically.      Emollient (EUCERIN EX) Apply  topically.      pantoprazole (PROTONIX) 40 MG Tablet Delayed Response Take 40 mg by mouth every day.      pregabalin (LYRICA) 25 MG Cap Take 25 mg by mouth 2 times a day.      predniSONE (DELTASONE) 5 MG Tab Take 5 mg by mouth every day.      tacrolimus (PROGRAF) 0.5 MG Cap Take 3.5 mg by mouth.      MAGNESIUM PO Take 840 mg by mouth 2 times a day.      Nutritional Supplements (NEPRO PO) Take  by mouth every day.      Polyethylene Glycol 3350 (MIRALAX PO) Take  by mouth as needed.       rosuvastatin (CRESTOR) 20 MG Tab Take 20 mg by mouth every evening.      calcium carbonate (TUMS) 500 MG Chew Tab Chew 1,000 mg 2 times a day.      vitamin D3 (CHOLECALCIFEROL) 1000 Unit (25 mcg) Tab Take 1,000 Units by mouth every day.      multivitamin Tab Take 1 Tablet by mouth every day.      ondansetron (ZOFRAN ODT) 8 MG TABLET DISPERSIBLE Take 8 mg by mouth every 8 hours as needed for Nausea.      Ferrous Sulfate (IRON PO) Take 325 mg by mouth every 48 hours.      mycophenolate (CELLCEPT) 500 MG tablet Take 500 mg by mouth 2 times a day.      ezetimibe (ZETIA) 10 MG Tab TK 1 T PO ONE TIME D      sulfamethoxazole-trimethoprim (BACTRIM DS) 800-160 MG tablet Take 0.5 Tablets by mouth one time. Take 1 tablet by mouth daily       No current facility-administered medications for this visit.

## 2025-08-28 ENCOUNTER — APPOINTMENT (OUTPATIENT)
Dept: CARDIOLOGY | Facility: MEDICAL CENTER | Age: 70
End: 2025-08-28
Attending: INTERNAL MEDICINE
Payer: MEDICARE